# Patient Record
Sex: FEMALE | Race: BLACK OR AFRICAN AMERICAN | Employment: UNEMPLOYED | URBAN - METROPOLITAN AREA
[De-identification: names, ages, dates, MRNs, and addresses within clinical notes are randomized per-mention and may not be internally consistent; named-entity substitution may affect disease eponyms.]

---

## 2017-08-15 ENCOUNTER — OFFICE VISIT (OUTPATIENT)
Dept: INTERNAL MEDICINE CLINIC | Age: 52
End: 2017-08-15

## 2017-08-15 VITALS
SYSTOLIC BLOOD PRESSURE: 110 MMHG | TEMPERATURE: 98.1 F | DIASTOLIC BLOOD PRESSURE: 60 MMHG | OXYGEN SATURATION: 100 % | BODY MASS INDEX: 20.25 KG/M2 | HEART RATE: 74 BPM | WEIGHT: 129 LBS | RESPIRATION RATE: 16 BRPM | HEIGHT: 67 IN

## 2017-08-15 DIAGNOSIS — Z12.11 SCREENING FOR COLON CANCER: ICD-10-CM

## 2017-08-15 DIAGNOSIS — N39.0 URINARY TRACT INFECTION WITHOUT HEMATURIA, SITE UNSPECIFIED: Primary | ICD-10-CM

## 2017-08-15 DIAGNOSIS — Z11.59 NEED FOR HEPATITIS C SCREENING TEST: ICD-10-CM

## 2017-08-15 LAB
BILIRUB UR QL STRIP: NEGATIVE
GLUCOSE UR-MCNC: NEGATIVE MG/DL
KETONES P FAST UR STRIP-MCNC: NEGATIVE MG/DL
PH UR STRIP: 6 [PH] (ref 4.6–8)
PROT UR QL STRIP: NEGATIVE MG/DL
SP GR UR STRIP: 1.02 (ref 1–1.03)
UA UROBILINOGEN AMB POC: ABNORMAL (ref 0.2–1)
URINALYSIS CLARITY POC: CLEAR
URINALYSIS COLOR POC: YELLOW
URINE BLOOD POC: NEGATIVE
URINE LEUKOCYTES POC: ABNORMAL
URINE NITRITES POC: NEGATIVE

## 2017-08-15 RX ORDER — CIPROFLOXACIN 500 MG/1
500 TABLET ORAL 2 TIMES DAILY
Qty: 14 TAB | Refills: 0 | Status: SHIPPED | OUTPATIENT
Start: 2017-08-15 | End: 2017-08-22

## 2017-08-15 NOTE — PROGRESS NOTES
Igor Hurd is a 47 y.o. female and presents with Results (lab work) and Bladder Infection  . Subjective:    Presents with an abnormal urine results revealing leucocytes present with out any other complaints reported. She also was noted to have bacteria in the urine. Review of Systems  Constitutional: negative for fevers, chills, anorexia and weight loss  Eyes:   negative for visual disturbance and irritation  ENT:   negative for tinnitus,sore throat,nasal congestion,ear pains. hoarseness  Respiratory:  negative for cough, hemoptysis, dyspnea,wheezing  CV:   negative for chest pain, palpitations, lower extremity edema  GI:   negative for nausea, vomiting, diarrhea, abdominal pain,melena  Endo:               negative for polyuria,polydipsia,polyphagia,heat intolerance  Genitourinary: negative for frequency, dysuria and hematuria  Integument:  negative for rash and pruritus  Hematologic:  negative for easy bruising and gum/nose bleeding  Musculoskel: negative for myalgias, arthralgias, back pain, muscle weakness, joint pain  Neurological:  negative for headaches, dizziness, vertigo, memory problems and gait   Behavl/Psych: negative for feelings of anxiety, depression, mood changes    Past Medical History:   Diagnosis Date    Bunion 7/13/2011    Cellulitis and abscess of unspecified site 7/13/2011    Chronic obstructive pulmonary disease (HCC)     hx bronchitis    Eczema 2/7/2011     History reviewed. No pertinent surgical history.   Social History     Social History    Marital status:      Spouse name: N/A    Number of children: N/A    Years of education: N/A     Social History Main Topics    Smoking status: Current Every Day Smoker     Packs/day: 0.25    Smokeless tobacco: Never Used    Alcohol use No    Drug use: No    Sexual activity: Yes     Other Topics Concern    None     Social History Narrative     Family History   Problem Relation Age of Onset    Diabetes Father    Alethea Poe Hypertension Father     Arthritis-rheumatoid Maternal Grandmother     Hypertension Maternal Grandmother     Cancer Other      Current Outpatient Prescriptions   Medication Sig Dispense Refill    ciprofloxacin HCl (CIPRO) 500 mg tablet Take 1 Tab by mouth two (2) times a day for 7 days. 14 Tab 0     No Known Allergies    Objective:  Visit Vitals    /60 (BP 1 Location: Right arm, BP Patient Position: Sitting)    Pulse 74    Temp 98.1 °F (36.7 °C) (Oral)    Resp 16    Ht 5' 7\" (1.702 m)    Wt 129 lb (58.5 kg)    LMP 07/06/2011    SpO2 100%    BMI 20.2 kg/m2     Physical Exam:   General appearance - alert, well appearing, and in no distress  Mental status - alert, oriented to person, place, and time  EYE-OCLLIN, EOMI, corneas normal, no foreign bodies  ENT-ENT exam normal, no neck nodes or sinus tenderness  Nose - normal and patent, no erythema, discharge or polyps  Mouth - mucous membranes moist, pharynx normal without lesions  Neck - supple, no significant adenopathy   Chest - clear to auscultation, no wheezes, rales or rhonchi, symmetric air entry   Heart - normal rate, regular rhythm, normal S1, S2, no murmurs, rubs, clicks or gallops   Abdomen - soft, nontender, nondistended, no masses or organomegaly  Lymph- no adenopathy palpable  Ext-peripheral pulses normal, no pedal edema, no clubbing or cyanosis  Skin-Warm and dry. no hyperpigmentation, vitiligo, or suspicious lesions  Neuro -alert, oriented, normal speech, no focal findings or movement disorder noted  Neck-normal C-spine, no tenderness, full ROM without pain  Feet-no nail deformities or callus formation with good pulses noted      Results for orders placed or performed in visit on 10/12/15   MYCOPLASMA AB, IGG/IGM   Result Value Ref Range    M. pneumoniae Ab, IgG 239 (H) 0 - 99 U/mL    M. pneumoniae Ab, IgM <770 0 - 769 U/mL       Assessment/Plan:    ICD-10-CM ICD-9-CM    1.  Urinary tract infection without hematuria, site unspecified N39.0 599.0 AMB POC URINALYSIS DIP STICK MANUAL W/O MICRO   2. Screening for colon cancer Z12.11 V76.51 OCCULT BLOOD, IMMUNOASSAY (FIT)   3. Need for hepatitis C screening test Z11.59 V73.89 HEPATITIS C AB     Orders Placed This Encounter    OCCULT BLOOD, IMMUNOASSAY (FIT)     Order Specific Question:   QUEST SOURCE     Answer:   Stool [1161]    HEPATITIS C AB    AMB POC URINALYSIS DIP STICK MANUAL W/O MICRO    ciprofloxacin HCl (CIPRO) 500 mg tablet     Sig: Take 1 Tab by mouth two (2) times a day for 7 days. Dispense:  14 Tab     Refill:  0     lose weight, follow low fat diet, follow low salt diet,Take 81mg aspirin daily  Patient Instructions   Origo.[email or web address] Race Activation    Thank you for requesting access to Funding Circle. Please follow the instructions below to securely access and download your online medical record. Funding Circle allows you to send messages to your doctor, view your test results, renew your prescriptions, schedule appointments, and more. How Do I Sign Up? 1. In your internet browser, go to [email or web address]  2. Click on the First Time User? Click Here link in the Sign In box. You will be redirect to the New Member Sign Up page. 3. Enter your Funding Circle Access Code exactly as it appears below. You will not need to use this code after youve completed the sign-up process. If you do not sign up before the expiration date, you must request a new code. Funding Circle Access Code: Activation code not generated  Current Funding Circle Status: Patient Declined (This is the date your Funding Circle access code will )    4. Enter the last four digits of your Social Security Number (xxxx) and Date of Birth (mm/dd/yyyy) as indicated and click Submit. You will be taken to the next sign-up page. 5. Create a Funding Circle ID. This will be your Funding Circle login ID and cannot be changed, so think of one that is secure and easy to remember. 6. Create a Funding Circle password. You can change your password at any time.   7. Enter your Password Reset Question and Answer. This can be used at a later time if you forget your password. 8. Enter your e-mail address. You will receive e-mail notification when new information is available in [address] 9. Click Sign Up. You can now view and download portions of your medical record. 10. Click the Download Summary menu link to download a portable copy of your medical information. Additional Information    If you have questions, please visit the Frequently Asked Questions section of the Archetype Media website at [email or web address] Remember, Archetype Media is NOT to be used for urgent needs. For medical emergencies, dial 911. Follow-up Disposition:  Return in about 3 months (around 11/15/2017), or if symptoms worsen or fail to improve. I have reviewed with the patient details of the assessment and plan and all questions were answered. Relevent patient education was performed. The most recent lab findings were reviewed with the patient. An After Visit Summary was printed and given to the patient.

## 2017-08-15 NOTE — MR AVS SNAPSHOT
Visit Information Date & Time Provider Department Dept. Phone Encounter #  
 8/15/2017  8:00 AM Gus Lao MD [address] [place] [address] [phone] [id number] Follow-up Instructions Return in about 3 months (around 11/15/2017), or if symptoms worsen or fail to improve. Upcoming Health Maintenance Date Due Hepatitis C Screening 1965 Pneumococcal 19-64 Medium Risk (1 of 1 - PPSV23) 4/27/1983 FOBT Q 1 YEAR AGE 50-75 4/27/2014 INFLUENZA AGE 9 TO ADULT 8/1/2017 BREAST CANCER SCRN MAMMOGRAM 9/1/2017 PAP AKA CERVICAL CYTOLOGY 11/17/2019 DTaP/Tdap/Td series (2 - Td) 2/27/2025 Allergies as of 8/15/2017  Review Complete On: 8/15/2017 By: Gus Lao MD  
 No Known Allergies Current Immunizations  Reviewed on 2/27/2015 Name Date Tdap 2/27/2015 Not reviewed this visit You Were Diagnosed With   
  
 Codes Comments Urinary tract infection without hematuria, site unspecified    -  Primary ICD-10-CM: N39.0 ICD-9-CM: 599.0 Screening for colon cancer     ICD-10-CM: Z12.11 ICD-9-CM: V76.51 Need for hepatitis C screening test     ICD-10-CM: Z11.59 
ICD-9-CM: V73.89 Vitals BP Pulse Temp Resp Height(growth percentile) Weight(growth percentile) 110/60 (BP 1 Location: Right arm, BP Patient Position: Sitting) 74 98.1 °F (36.7 °C) (Oral) 16 5' 7\" (1.702 m) 129 lb (58.5 kg) LMP SpO2 BMI OB Status Smoking Status 07/06/2011 100% 20.2 kg/m2 Postmenopausal Current Every Day Smoker BMI and BSA Data Body Mass Index Body Surface Area  
 20.2 kg/m 2 1.66 m 2 Preferred Pharmacy Pharmacy Name Phone [facility] [phone] Your Updated Medication List  
  
   
This list is accurate as of: 8/15/17  8:44 AM.  Always use your most recent med list.  
  
  
  
  
 ciprofloxacin HCl 500 mg tablet Commonly known as:  CIPRO Take 1 Tab by mouth two (2) times a day for 7 days. Prescriptions Sent to Pharmacy Refills  
 ciprofloxacin HCl (CIPRO) 500 mg tablet 0 Sig: Take 1 Tab by mouth two (2) times a day for 7 days. Class: Normal  
 Pharmacy: [facility] [address] #: [phone] Route: Oral  
  
We Performed the Following AMB POC URINALYSIS DIP STICK MANUAL W/O MICRO [53422 CPT(R)] HEPATITIS C AB [56679 CPT(R)] OCCULT BLOOD, IMMUNOASSAY (FIT) [id number] CPT(R)] Follow-up Instructions Return in about 3 months (around 11/15/2017), or if symptoms worsen or fail to improve. Patient Instructions Pharmlyhart Activation Thank you for requesting access to Tourjive. Please follow the instructions below to securely access and download your online medical record. Tourjive allows you to send messages to your doctor, view your test results, renew your prescriptions, schedule appointments, and more. How Do I Sign Up? 1. In your internet browser, go to [email or web address] 
2. Click on the First Time User? Click Here link in the Sign In box. You will be redirect to the New Member Sign Up page. 3. Enter your Tourjive Access Code exactly as it appears below. You will not need to use this code after youve completed the sign-up process. If you do not sign up before the expiration date, you must request a new code. Tourjive Access Code: Activation code not generated Current Tourjive Status: Patient Declined (This is the date your Tourjive access code will ) 4. Enter the last four digits of your Social Security Number (xxxx) and Date of Birth (mm/dd/yyyy) as indicated and click Submit. You will be taken to the next sign-up page. 5. Create a Tourjive ID.  This will be your Tourjive login ID and cannot be changed, so think of one that is secure and easy to remember. 6. Create a BlueMessaging password. You can change your password at any time. 7. Enter your Password Reset Question and Answer. This can be used at a later time if you forget your password. 8. Enter your e-mail address. You will receive e-mail notification when new information is available in [address] 9. Click Sign Up. You can now view and download portions of your medical record. 10. Click the Download Summary menu link to download a portable copy of your medical information. Additional Information If you have questions, please visit the Frequently Asked Questions section of the BlueMessaging website at [email or web address] Remember, BlueMessaging is NOT to be used for urgent needs. For medical emergencies, dial 911. Please provide this summary of care documentation to your next provider. Your primary care clinician is listed as NONE. If you have any questions after today's visit, please call [phone].

## 2017-08-15 NOTE — PATIENT INSTRUCTIONS
Casen[employer]ar[employer] Runway Thief Activation    Thank you for requesting access to Tradeos. Please follow the instructions below to securely access and download your online medical record. Tradeos allows you to send messages to your doctor, view your test results, renew your prescriptions, schedule appointments, and more. How Do I Sign Up? 1. In your internet browser, go to [email or web address]  2. Click on the First Time User? Click Here link in the Sign In box. You will be redirect to the New Member Sign Up page. 3. Enter your Tradeos Access Code exactly as it appears below. You will not need to use this code after youve completed the sign-up process. If you do not sign up before the expiration date, you must request a new code. Tradeos Access Code: Activation code not generated  Current Tradeos Status: Patient Declined (This is the date your Tradeos access code will )    4. Enter the last four digits of your Social Security Number (xxxx) and Date of Birth (mm/dd/yyyy) as indicated and click Submit. You will be taken to the next sign-up page. 5. Create a Tradeos ID. This will be your Tradeos login ID and cannot be changed, so think of one that is secure and easy to remember. 6. Create a Tradeos password. You can change your password at any time. 7. Enter your Password Reset Question and Answer. This can be used at a later time if you forget your password. 8. Enter your e-mail address. You will receive e-mail notification when new information is available in [address] 9. Click Sign Up. You can now view and download portions of your medical record. 10. Click the Download Summary menu link to download a portable copy of your medical information. Additional Information    If you have questions, please visit the Frequently Asked Questions section of the Tradeos website at [email or web address] Remember, Tradeos is NOT to be used for urgent needs. For medical emergencies, dial 911.

## 2017-12-04 ENCOUNTER — OFFICE VISIT (OUTPATIENT)
Dept: INTERNAL MEDICINE CLINIC | Age: 52
End: 2017-12-04

## 2017-12-04 DIAGNOSIS — R35.0 URINE FREQUENCY: Primary | ICD-10-CM

## 2017-12-04 DIAGNOSIS — N34.2 INFECTIVE URETHRITIS: ICD-10-CM

## 2017-12-04 LAB
BILIRUB UR QL STRIP: NEGATIVE
GLUCOSE UR-MCNC: NEGATIVE MG/DL
KETONES P FAST UR STRIP-MCNC: NEGATIVE MG/DL
PH UR STRIP: 5.5 [PH] (ref 4.6–8)
PROT UR QL STRIP: NEGATIVE
SP GR UR STRIP: 1.03 (ref 1–1.03)
UA UROBILINOGEN AMB POC: NORMAL (ref 0.2–1)
URINALYSIS CLARITY POC: CLEAR
URINALYSIS COLOR POC: YELLOW
URINE BLOOD POC: NEGATIVE
URINE LEUKOCYTES POC: NORMAL
URINE NITRITES POC: NEGATIVE

## 2017-12-04 NOTE — PATIENT INSTRUCTIONS
CloudByte Activation    Thank you for requesting access to CloudByte. Please follow the instructions below to securely access and download your online medical record. CloudByte allows you to send messages to your doctor, view your test results, renew your prescriptions, schedule appointments, and more. How Do I Sign Up? 1. In your internet browser, go to [email or web address]  2. Click on the First Time User? Click Here link in the Sign In box. You will be redirect to the New Member Sign Up page. 3. Enter your CloudByte Access Code exactly as it appears below. You will not need to use this code after youve completed the sign-up process. If you do not sign up before the expiration date, you must request a new code. CloudByte Access Code: [email or web address]  Expires: 3/4/2018  2:23 PM (This is the date your CloudByte access code will )    4. Enter the last four digits of your Social Security Number (xxxx) and Date of Birth (mm/dd/yyyy) as indicated and click Submit. You will be taken to the next sign-up page. 5. Create a CloudByte ID. This will be your CloudByte login ID and cannot be changed, so think of one that is secure and easy to remember. 6. Create a CloudByte password. You can change your password at any time. 7. Enter your Password Reset Question and Answer. This can be used at a later time if you forget your password. 8. Enter your e-mail address. You will receive e-mail notification when new information is available in [address] 9. Click Sign Up. You can now view and download portions of your medical record. 10. Click the Download Summary menu link to download a portable copy of your medical information. Additional Information    If you have questions, please visit the Frequently Asked Questions section of the CloudByte website at [email or web address] Remember, CloudByte is NOT to be used for urgent needs. For medical emergencies, dial 911.

## 2017-12-04 NOTE — PROGRESS NOTES
Alvaro Bass is a 47 y.o. female and presents with No chief complaint on file. .  Subjective:  She has a faint urine discharge  She has denied any symptoms        Review of Systems  Constitutional: negative for fevers, chills, anorexia and weight loss  Eyes:   negative for visual disturbance and irritation  ENT:   negative for tinnitus,sore throat,nasal congestion,ear pains. hoarseness  Respiratory:  negative for cough, hemoptysis, dyspnea,wheezing  CV:   negative for chest pain, palpitations, lower extremity edema  GI:   negative for nausea, vomiting, diarrhea, abdominal pain,melena  Endo:               negative for polyuria,polydipsia,polyphagia,heat intolerance  Genitourinary: negative for frequency, dysuria and hematuria  Integument:  negative for rash and pruritus  Hematologic:  negative for easy bruising and gum/nose bleeding  Musculoskel: negative for myalgias, arthralgias, back pain, muscle weakness, joint pain  Neurological:  negative for headaches, dizziness, vertigo, memory problems and gait   Behavl/Psych: negative for feelings of anxiety, depression, mood changes    Past Medical History:   Diagnosis Date    Bunion 7/13/2011    Cellulitis and abscess of unspecified site 7/13/2011    Chronic obstructive pulmonary disease (HCC)     hx bronchitis    Eczema 2/7/2011     No past surgical history on file.   Social History     Social History    Marital status:      Spouse name: N/A    Number of children: N/A    Years of education: N/A     Social History Main Topics    Smoking status: Current Every Day Smoker     Packs/day: 0.25    Smokeless tobacco: Never Used    Alcohol use No    Drug use: No    Sexual activity: Yes     Other Topics Concern    Not on file     Social History Narrative     Family History   Problem Relation Age of Onset    Diabetes Father     Hypertension Father     Arthritis-rheumatoid Maternal Grandmother     Hypertension Maternal Grandmother     Cancer Other No Known Allergies    Objective:  Visit Vitals    [facility] 07/06/2011     Physical Exam:   General appearance - alert, well appearing, and in no distress  Mental status - alert, oriented to person, place, and time  EYE-COLLIN, EOMI, corneas normal, no foreign bodies  ENT-ENT exam normal, no neck nodes or sinus tenderness  Nose - normal and patent, no erythema, discharge or polyps  Mouth - mucous membranes moist, pharynx normal without lesions  Neck - supple, no significant adenopathy   Chest - clear to auscultation, no wheezes, rales or rhonchi, symmetric air entry   Heart - normal rate, regular rhythm, normal S1, S2, no murmurs, rubs, clicks or gallops   Abdomen - soft, nontender, nondistended, no masses or organomegaly  Lymph- no adenopathy palpable  Ext-peripheral pulses normal, no pedal edema, no clubbing or cyanosis  Skin-Warm and dry. no hyperpigmentation, vitiligo, or suspicious lesions  Neuro -alert, oriented, normal speech, no focal findings or movement disorder noted  Neck-normal C-spine, no tenderness, full ROM without pain  Feet-no nail deformities or callus formation with good pulses noted      Results for orders placed or performed in visit on 08/15/17   AMB POC URINALYSIS DIP STICK MANUAL W/O MICRO   Result Value Ref Range    Color (UA POC) Yellow     Clarity (UA POC) Clear     Glucose (UA POC) Negative Negative    Bilirubin (UA POC) Negative Negative    Ketones (UA POC) Negative Negative    Specific gravity (UA POC) 1.020 1.001 - 1.035    Blood (UA POC) Negative Negative    pH (UA POC) 6.0 4.6 - 8.0    Protein (UA POC) Negative Negative mg/dL    Urobilinogen (UA POC) 0.2 mg/dL 0.2 - 1    Nitrites (UA POC) Negative Negative    Leukocyte esterase (UA POC) 3+ Negative       Assessment/Plan:    ICD-10-CM ICD-9-CM    1.  Infective urethritis N34.2 597.80 CULTURE, URINE     Orders Placed This Encounter    CULTURE, URINE     lose weight, increase physical activity, limit alcohol consumption,Take 81mg aspirin daily  Patient Instructions   MyChart Activation    Thank you for requesting access to Patient Feed. Please follow the instructions below to securely access and download your online medical record. Patient Feed allows you to send messages to your doctor, view your test results, renew your prescriptions, schedule appointments, and more. How Do I Sign Up? 1. In your internet browser, go to [email or web address]  2. Click on the First Time User? Click Here link in the Sign In box. You will be redirect to the New Member Sign Up page. 3. Enter your Patient Feed Access Code exactly as it appears below. You will not need to use this code after youve completed the sign-up process. If you do not sign up before the expiration date, you must request a new code. Patient Feed Access Code: [email or web address]  Expires: 3/4/2018  2:23 PM (This is the date your Patient Feed access code will )    4. Enter the last four digits of your Social Security Number (xxxx) and Date of Birth (mm/dd/yyyy) as indicated and click Submit. You will be taken to the next sign-up page. 5. Create a Patient Feed ID. This will be your Patient Feed login ID and cannot be changed, so think of one that is secure and easy to remember. 6. Create a Patient Feed password. You can change your password at any time. 7. Enter your Password Reset Question and Answer. This can be used at a later time if you forget your password. 8. Enter your e-mail address. You will receive e-mail notification when new information is available in [address] 9. Click Sign Up. You can now view and download portions of your medical record. 10. Click the Download Summary menu link to download a portable copy of your medical information. Additional Information    If you have questions, please visit the Frequently Asked Questions section of the Patient Feed website at [email or web address] Remember, Patient Feed is NOT to be used for urgent needs.  For medical emergencies, dial 911.           Follow-up Disposition:  Return in about 3 months (around 3/4/2018), or if symptoms worsen or fail to improve. I have reviewed with the patient details of the assessment and plan and all questions were answered. Relevent patient education was performed. The most recent lab findings were reviewed with the patient. An After Visit Summary was printed and given to the patient.

## 2017-12-20 VITALS
OXYGEN SATURATION: 95 % | SYSTOLIC BLOOD PRESSURE: 90 MMHG | DIASTOLIC BLOOD PRESSURE: 66 MMHG | TEMPERATURE: 98.2 F | BODY MASS INDEX: 20.09 KG/M2 | HEIGHT: 67 IN | RESPIRATION RATE: 16 BRPM | WEIGHT: 128 LBS | HEART RATE: 63 BPM

## 2018-01-03 ENCOUNTER — HOSPITAL ENCOUNTER (EMERGENCY)
Age: 53
Discharge: HOME | End: 2018-01-03
Attending: EMERGENCY MEDICINE | Admitting: EMERGENCY MEDICINE
Payer: COMMERCIAL

## 2018-01-03 ENCOUNTER — APPOINTMENT (OUTPATIENT)
Dept: GENERAL RADIOLOGY | Age: 53
End: 2018-01-03
Attending: PHYSICIAN ASSISTANT
Payer: COMMERCIAL

## 2018-01-03 VITALS
HEART RATE: 69 BPM | OXYGEN SATURATION: 99 % | BODY MASS INDEX: 20.31 KG/M2 | WEIGHT: 129.41 LBS | SYSTOLIC BLOOD PRESSURE: 111 MMHG | HEIGHT: 67 IN | TEMPERATURE: 99.9 F | DIASTOLIC BLOOD PRESSURE: 61 MMHG | RESPIRATION RATE: 20 BRPM

## 2018-01-03 DIAGNOSIS — F17.200 NICOTINE DEPENDENCE, UNCOMPLICATED, UNSPECIFIED NICOTINE PRODUCT TYPE: ICD-10-CM

## 2018-01-03 DIAGNOSIS — J10.1 INFLUENZA B: Primary | ICD-10-CM

## 2018-01-03 LAB
APPEARANCE UR: ABNORMAL
BACTERIA URNS QL MICRO: ABNORMAL /HPF
BILIRUB UR QL: NEGATIVE
COLOR UR: ABNORMAL
EPITH CASTS URNS QL MICRO: ABNORMAL /LPF
FLUAV AG NPH QL IA: NEGATIVE
FLUBV AG NOSE QL IA: POSITIVE
GLUCOSE UR STRIP.AUTO-MCNC: NEGATIVE MG/DL
HGB UR QL STRIP: NEGATIVE
KETONES UR QL STRIP.AUTO: NEGATIVE MG/DL
LEUKOCYTE ESTERASE UR QL STRIP.AUTO: NEGATIVE
MUCOUS THREADS URNS QL MICRO: ABNORMAL /LPF
NITRITE UR QL STRIP.AUTO: NEGATIVE
PH UR STRIP: 5 [PH] (ref 5–8)
PROT UR STRIP-MCNC: 30 MG/DL
RBC #/AREA URNS HPF: ABNORMAL /HPF (ref 0–5)
SP GR UR REFRACTOMETRY: 1.03 (ref 1–1.03)
UA: UC IF INDICATED,UAUC: ABNORMAL
UROBILINOGEN UR QL STRIP.AUTO: 0.2 EU/DL (ref 0.2–1)
WBC URNS QL MICRO: ABNORMAL /HPF (ref 0–4)

## 2018-01-03 PROCEDURE — 87086 URINE CULTURE/COLONY COUNT: CPT | Performed by: EMERGENCY MEDICINE

## 2018-01-03 PROCEDURE — 99283 EMERGENCY DEPT VISIT LOW MDM: CPT

## 2018-01-03 PROCEDURE — 71046 X-RAY EXAM CHEST 2 VIEWS: CPT

## 2018-01-03 PROCEDURE — 81001 URINALYSIS AUTO W/SCOPE: CPT | Performed by: EMERGENCY MEDICINE

## 2018-01-03 PROCEDURE — 87804 INFLUENZA ASSAY W/OPTIC: CPT | Performed by: EMERGENCY MEDICINE

## 2018-01-03 RX ORDER — OSELTAMIVIR PHOSPHATE 75 MG/1
75 CAPSULE ORAL 2 TIMES DAILY
Qty: 10 CAP | Refills: 0 | Status: SHIPPED | OUTPATIENT
Start: 2018-01-03 | End: 2018-01-08

## 2018-01-03 RX ORDER — BENZONATATE 100 MG/1
100 CAPSULE ORAL
Qty: 30 CAP | Refills: 0 | Status: SHIPPED | OUTPATIENT
Start: 2018-01-03 | End: 2018-01-10

## 2018-01-03 NOTE — ED TRIAGE NOTES
Pt arrived ambulatory from [facility] to [facility] bed 6 with cc of chills/body aches/ headache and fatigue. Pt states she has been feeling bad for 2 days. pt denies any vomiting, diarrhea. Pt in no acute distress. VSS.

## 2018-01-03 NOTE — DISCHARGE INSTRUCTIONS

## 2018-01-03 NOTE — Clinical Note
1. Start Tamiflu and Tessalon Perles 2. Rest, increase fluids, ibuprofen or tylenol as needed for fevers 3.  Follow up with PCP

## 2018-01-03 NOTE — ED PROVIDER NOTES
EMERGENCY DEPARTMENT HISTORY AND PHYSICAL EXAM      Date: 1/3/2018  Patient Name: Sonia Dill    History of Presenting Illness     Chief Complaint   Patient presents with    Chills     Cold or flu that has been going on for a day or two       History Provided By: Patient    HPI: Sonia Dill, 47 y.o. female with PMHx significant for COPD, presents ambulatory to the ED with cc of persistent fever and cough that is intermittently productive over the past two days. She notes recent contact with family members who currently have similar sx's. Pt endorses taking Mucinex last night wnr of sx's. Pt notes 1 episode of vomiting yesterday but has otherwise been able to tolerate fluids. She reports tobacco use, but denies EtOH and illicit drug use. Pt specifically denies any sore throat, CP, SOB, and abdominal pain. PCP: None    There are no other complaints, changes, or physical findings at this time. Past History     Past Medical History:  Past Medical History:   Diagnosis Date    Bunion 7/13/2011    Cellulitis and abscess of unspecified site 7/13/2011    Chronic obstructive pulmonary disease (HCC)     hx bronchitis    Eczema 2/7/2011       Past Surgical History:  History reviewed. No pertinent surgical history. Family History:  Family History   Problem Relation Age of Onset    Diabetes Father     Hypertension Father     Arthritis-rheumatoid Maternal Grandmother     Hypertension Maternal Grandmother     Cancer Other        Social History:  Social History   Substance Use Topics    Smoking status: Current Every Day Smoker     Packs/day: 0.25    Smokeless tobacco: Never Used    Alcohol use No       Allergies:  No Known Allergies      Review of Systems   Review of Systems   Constitutional: Positive for fever. Negative for chills. HENT: Negative. Negative for rhinorrhea and sore throat. Eyes: Negative. Negative for visual disturbance. Respiratory: Positive for cough.  Negative for chest tightness, shortness of breath and wheezing. Cardiovascular: Negative. Negative for chest pain and palpitations. Gastrointestinal: Negative. Negative for abdominal pain, constipation, diarrhea, nausea and vomiting. Genitourinary: Negative. Negative for dysuria and hematuria. Musculoskeletal: Negative. Negative for arthralgias and myalgias. Skin: Negative. Negative for rash. Allergic/Immunologic: Negative. Negative for environmental allergies and food allergies. Neurological: Negative. Negative for headaches. Psychiatric/Behavioral: Negative. Negative for suicidal ideas. Physical Exam   Physical Exam   Constitutional: She is oriented to person, place, and time. She appears well-developed and well-nourished. No distress. Pt is an AAF, appears sickly but non-toxic, awake and alert in NAD. HENT:   Head: Normocephalic and atraumatic. Right Ear: Tympanic membrane, external ear and ear canal normal.   Left Ear: Tympanic membrane, external ear and ear canal normal.   Nose: Rhinorrhea (clear) present. Mouth/Throat: Uvula is midline, oropharynx is clear and moist and mucous membranes are normal.   Eyes: Conjunctivae and EOM are normal. Pupils are equal, round, and reactive to light. Right eye exhibits discharge (clear). Left eye exhibits discharge (clear). Neck: Normal range of motion. Cardiovascular: Normal rate and normal heart sounds. Pulmonary/Chest: Effort normal and breath sounds normal. No stridor. No respiratory distress. She has no wheezes. She has no rales. +productive cough on exam   Abdominal: Soft. Bowel sounds are normal. There is no tenderness. There is no guarding. No CVA tenderness b/l. Musculoskeletal: Normal range of motion. She exhibits no edema or tenderness. Neurological: She is alert and oriented to person, place, and time. Coordination normal.   No focal neuro deficits. Skin: Skin is warm and dry. No rash noted. She is not diaphoretic.  No erythema. No pallor. Psychiatric: She has a normal mood and affect. Her behavior is normal.   Vitals reviewed. Diagnostic Study Results     Labs -     Recent Results (from the past 12 hour(s))   INFLUENZA A & B AG (RAPID TEST)    Collection Time: 01/03/18  3:20 PM   Result Value Ref Range    Influenza A Antigen NEGATIVE  NEG      Influenza B Antigen POSITIVE (A) NEG     URINALYSIS W/ REFLEX CULTURE    Collection Time: 01/03/18  5:36 PM   Result Value Ref Range    Color DARK YELLOW      Appearance CLOUDY (A) CLEAR      Specific gravity 1.030 1.003 - 1.030      pH (UA) 5.0 5.0 - 8.0      Protein 30 (A) NEG mg/dL    Glucose NEGATIVE  NEG mg/dL    Ketone NEGATIVE  NEG mg/dL    Bilirubin NEGATIVE  NEG      Blood NEGATIVE  NEG      Urobilinogen 0.2 0.2 - 1.0 EU/dL    Nitrites NEGATIVE  NEG      Leukocyte Esterase NEGATIVE  NEG      WBC 0-4 0 - 4 /hpf    RBC 0-5 0 - 5 /hpf    Epithelial cells MANY (A) FEW /lpf    Bacteria 2+ (A) NEG /hpf    UA:UC IF INDICATED URINE CULTURE ORDERED (A) CNI      Mucus 3+ (A) NEG /lpf       Radiologic Studies -     CXR Results  (Last 48 hours)               01/03/18 1841  XR CHEST PA LAT Final result    Impression:  IMPRESSION: No airspace disease or other acute abnormality. Narrative:  EXAM:  XR CHEST PA LAT. INDICATION: Flulike symptoms. COMPARISON: None. FINDINGS:    PA and lateral radiographs of the chest were obtained. Lungs: The lungs are clear of mass, nodule, airspace disease or edema. Pleura: There is no pleural effusion or pneumothorax. Mediastinum: The cardiac and mediastinal contours and pulmonary vascularity are   normal.  The aorta is atherosclerotic. Bones and soft tissues: The bones and soft tissues are within normal limits. Medical Decision Making   I am the first provider for this patient.     I reviewed the vital signs, available nursing notes, past medical history, past surgical history, family history and social history. Vital Signs-Reviewed the patient's vital signs. Patient Vitals for the past 12 hrs:   Temp Pulse Resp BP SpO2   01/03/18 1510 99.9 °F (37.7 °C) 69 20 111/61 99 %       Records Reviewed: Nursing Notes and Old Medical Records    Provider Notes (Medical Decision Making):     DDx: influenza, PNA, URI    ED Course:   Initial assessment performed. The patients presenting problems have been discussed, and they are in agreement with the care plan formulated and outlined with them. I have encouraged them to ask questions as they arise throughout their visit. TOBACCO COUNSELING:  Upon evaluation, pt expressed that they are a current tobacco user. Pt has been counseled on the dangers of smoking and was encouraged to quit as soon as possible in order to decrease further risks to their health. Pt has conveyed their understanding of the risks involved should they continue to use tobacco products. Disposition:    DISCHARGE NOTE:  6:59 PM  The patient is ready for discharge. The patients signs, symptoms, diagnosis, and instructions for discharge have been discussed and the pt has conveyed their understanding. The patient is to follow up as recommended with PCP or return to the ER should their symptoms worsen. Plan has been discussed and patient has conveyed their agreement. PLAN:  1. Discharge home  Current Discharge Medication List      START taking these medications    Details   oseltamivir (TAMIFLU) 75 mg capsule Take 1 Cap by mouth two (2) times a day for 5 days. Qty: 10 Cap, Refills: 0      benzonatate (TESSALON PERLES) 100 mg capsule Take 1 Cap by mouth three (3) times daily as needed for Cough for up to 7 days. Qty: 30 Cap, Refills: 0           2.    Follow-up Information     Follow up With Details Comments Contact Info    PCP- see attached Schedule an appointment as soon as possible for a visit in 2 days      [facility] EMERGENCY DEPT  As needed or, If symptoms worsen [address] [facility] [address] [place] [address]  [phone]        Return to ED if worse     Diagnosis     Clinical Impression:   1. Influenza B    2. Nicotine dependence, uncomplicated, unspecified nicotine product type        Attestations: This note is prepared by Ursula Plascencia, acting as Scribe for Anson Harper PA-C. Anson Harper PA-C: The scribe's documentation has been prepared under my direction and personally reviewed by me in its entirety. I confirm that the note above accurately reflects all work, treatment, procedures, and medical decision making performed by me. This note will not be viewable in [address]

## 2018-01-03 NOTE — LETTER
[address] 
[facility] EMERGENCY DEPT 
[address] [place] [address] [address] [id number] [phone] Work/School Note Date: 1/3/2018 To Whom It May concern: 
 
Sonia Huffman was seen and treated today in the emergency room by the following provider(s): 
Attending Provider: Iva Renee MD 
Physician Assistant: DANIEL Shankar. Sonia Dill may return to work once fever resolves for at least 24 hours. Sincerely, Angel Shankar

## 2018-01-03 NOTE — ED NOTES
PA has reviewed discharge instructions with the patient. The patient verbalized understanding. Pt ambulatory home with discharge papers in hand.

## 2018-01-05 LAB
BACTERIA SPEC CULT: NORMAL
CC UR VC: NORMAL
SERVICE CMNT-IMP: NORMAL

## 2019-06-18 ENCOUNTER — HOSPITAL ENCOUNTER (EMERGENCY)
Age: 54
Discharge: HOME | End: 2019-06-18
Attending: EMERGENCY MEDICINE
Payer: COMMERCIAL

## 2019-06-18 VITALS
DIASTOLIC BLOOD PRESSURE: 78 MMHG | BODY MASS INDEX: 24.96 KG/M2 | HEIGHT: 67 IN | RESPIRATION RATE: 17 BRPM | SYSTOLIC BLOOD PRESSURE: 113 MMHG | WEIGHT: 159 LBS | OXYGEN SATURATION: 99 % | TEMPERATURE: 98.8 F | HEART RATE: 92 BPM

## 2019-06-18 DIAGNOSIS — J06.9 UPPER RESPIRATORY TRACT INFECTION, UNSPECIFIED TYPE: Primary | ICD-10-CM

## 2019-06-18 PROCEDURE — 99282 EMERGENCY DEPT VISIT SF MDM: CPT

## 2019-06-18 RX ORDER — BENZONATATE 100 MG/1
100 CAPSULE ORAL
Qty: 30 CAP | Refills: 0 | Status: SHIPPED | OUTPATIENT
Start: 2019-06-18 | End: 2019-06-25

## 2019-06-18 RX ORDER — AZITHROMYCIN 250 MG/1
TABLET, FILM COATED ORAL
Qty: 6 TAB | Refills: 0 | Status: SHIPPED | OUTPATIENT
Start: 2019-06-18

## 2019-06-18 NOTE — ED PROVIDER NOTES
EMERGENCY DEPARTMENT HISTORY AND PHYSICAL EXAM      Date: 6/18/2019  Patient Name: Sonia Dill    History of Presenting Illness     Chief Complaint   Patient presents with    Cough       History Provided By: Patient    HPI: Sonia Dill, 53 y.o. female with PMHx significant for eczema, presents ambulatory to the ED with cc of productive cough with assoc congestion x 4 days. Denies sore throat, ear pain, fever/chills. Pt has taken mucinex without relief of sx. Denies discomfort currently. Denies hx asthma. Pt does not use inhaler. There are no other complaints, changes, or physical findings at this time. PCP: None    No current facility-administered medications on file prior to encounter. No current outpatient medications on file prior to encounter. Past History     Past Medical History:  Past Medical History:   Diagnosis Date    Bunion 7/13/2011    Cellulitis and abscess of unspecified site 7/13/2011    Chronic obstructive pulmonary disease (HCC)     hx bronchitis    Eczema 2/7/2011       Past Surgical History:  History reviewed. No pertinent surgical history. Family History:  Family History   Problem Relation Age of Onset    Diabetes Father     Hypertension Father     Arthritis-rheumatoid Maternal Grandmother     Hypertension Maternal Grandmother     Cancer Other        Social History:  Social History     Tobacco Use    Smoking status: Current Every Day Smoker     Packs/day: 0.25    Smokeless tobacco: Current User   Substance Use Topics    Alcohol use: No    Drug use: No       Allergies:  No Known Allergies      Review of Systems   Review of Systems   Constitutional: Negative for chills and fever. HENT: Positive for congestion. Negative for postnasal drip, rhinorrhea, sinus pressure, sinus pain, sneezing and sore throat. Respiratory: Positive for cough. Negative for shortness of breath. Cardiovascular: Negative for chest pain.    Gastrointestinal: Negative for abdominal pain, nausea and vomiting. Genitourinary: Negative for flank pain. Musculoskeletal: Negative for back pain and myalgias. Skin: Negative for color change, pallor, rash and wound. Neurological: Negative for dizziness, weakness and light-headedness. All other systems reviewed and are negative. Physical Exam   Physical Exam   Constitutional: She is oriented to person, place, and time. She appears well-developed and well-nourished. No distress. HENT:   Head: Normocephalic and atraumatic. Right Ear: Tympanic membrane, external ear and ear canal normal.   Left Ear: Tympanic membrane, external ear and ear canal normal.   Nose: Mucosal edema present. Right sinus exhibits no maxillary sinus tenderness and no frontal sinus tenderness. Left sinus exhibits no maxillary sinus tenderness and no frontal sinus tenderness. Mouth/Throat: Uvula is midline, oropharynx is clear and moist and mucous membranes are normal.   No tonsillar or pharyngeal erythema   Eyes: Conjunctivae are normal.   Cardiovascular: Normal rate, regular rhythm and normal heart sounds. No murmur heard. Pulmonary/Chest: Effort normal and breath sounds normal. No respiratory distress. Lungs CTA   Abdominal: Soft. Bowel sounds are normal. She exhibits no distension. Musculoskeletal: Normal range of motion. Neurological: She is alert and oriented to person, place, and time. Skin: Skin is warm. No rash noted. Psychiatric: She has a normal mood and affect. Her behavior is normal.   Nursing note and vitals reviewed. Diagnostic Study Results     Labs -   No results found for this or any previous visit (from the past 12 hour(s)). Radiologic Studies -   No orders to display     CT Results  (Last 48 hours)    None        CXR Results  (Last 48 hours)    None            Medical Decision Making   I am the first provider for this patient.     I reviewed the vital signs, available nursing notes, past medical history, past surgical history, family history and social history. Vital Signs-Reviewed the patient's vital signs. Patient Vitals for the past 12 hrs:   Temp Pulse Resp BP SpO2   06/18/19 1130 98.8 °F (37.1 °C) 92 17 113/78 99 %       Records Reviewed: Nursing Notes and Old Medical Records    Provider Notes (Medical Decision Making):   DDx: URI, PNA, Bronchitis    Pt treated for PNA with abx, so CXR would not change treatment plan. ED Course:   Initial assessment performed. The patients presenting problems have been discussed, and they are in agreement with the care plan formulated and outlined with them. I have encouraged them to ask questions as they arise throughout their visit. Disposition:  11:50 AM  Discussed dx and treatment plan. Discussed importance of PCP follow up. All questions answered. Pt voiced they understood. Return if sx worsen. PLAN:  1. Current Discharge Medication List      START taking these medications    Details   azithromycin (ZITHROMAX) 250 mg tablet Take two tablets today then one tablet daily  Qty: 6 Tab, Refills: 0      benzonatate (TESSALON PERLES) 100 mg capsule Take 1 Cap by mouth three (3) times daily as needed for Cough for up to 7 days. Qty: 30 Cap, Refills: 0           2. Follow-up Information     Follow up With Specialties Details Why [address]  Schedule an appointment as soon as possible for a visit As needed [address] [facility] [place], [address] [place] [address] Drive  [phone]        Return to ED if worse     Diagnosis     Clinical Impression:   1.  Upper respiratory tract infection, unspecified type

## 2019-06-18 NOTE — LETTER
[facility] EMERGENCY DEPT 
[address] [facility] [address] [id number] [phone] Work/School Note Date: 6/18/2019 To Whom It May concern: 
 
Sonia Dill was seen and treated today in the emergency room by the following provider(s): 
Attending Provider: Moose Arguelles MD 
Physician Assistant: DANIEL Sofia. Sonia Dill may return to work on 6/19/2019. Sincerely, Columbus Severin, PA

## 2019-06-18 NOTE — ED NOTES
Pt presents in ER with cough with clear phlegm x 3-4 days,denies n/v/d,chest pain,sob. Pt alert,oriented x 4,no acute distress noticed on arrival.   Emergency Department Nursing Plan of Care       The Nursing Plan of Care is developed from the Nursing assessment and Emergency Department Attending provider initial evaluation. The plan of care may be reviewed in the ED Provider note.     The Plan of Care was developed with the following considerations:   Patient / Family readiness to learn indicated by:verbalized understanding  Persons(s) to be included in education: patient  Barriers to Learning/Limitations:No    Signed     Benji Villegas, RN    6/18/2019   11:35 AM

## 2019-06-18 NOTE — ED TRIAGE NOTES
Patient 1ppd smoker, presents to ED with c/o cough and congestion persisting for several days. Currently afebrile. Describes occasional cough.

## 2019-06-18 NOTE — DISCHARGE INSTRUCTIONS
Patient Education        Upper Respiratory Infection (Cold): Care Instructions  Your Care Instructions    An upper respiratory infection, or URI, is an infection of the nose, sinuses, or throat. URIs are spread by coughs, sneezes, and direct contact. The common cold is the most frequent kind of URI. The flu and sinus infections are other kinds of URIs. Almost all URIs are caused by viruses. Antibiotics won't cure them. But you can treat most infections with home care. This may include drinking lots of fluids and taking over-the-counter pain medicine. You will probably feel better in 4 to 10 days. The doctor has checked you carefully, but problems can develop later. If you notice any problems or new symptoms, get medical treatment right away. Follow-up care is a key part of your treatment and safety. Be sure to make and go to all appointments, and call your doctor if you are having problems. It's also a good idea to know your test results and keep a list of the medicines you take. How can you care for yourself at home? · To prevent dehydration, drink plenty of fluids, enough so that your urine is light yellow or clear like water. Choose water and other caffeine-free clear liquids until you feel better. If you have kidney, heart, or liver disease and have to limit fluids, talk with your doctor before you increase the amount of fluids you drink. · Take an over-the-counter pain medicine, such as acetaminophen (Tylenol), ibuprofen (Advil, Motrin), or naproxen (Aleve). Read and follow all instructions on the label. · Before you use cough and cold medicines, check the label. These medicines may not be safe for young children or for people with certain health problems. · Be careful when taking over-the-counter cold or flu medicines and Tylenol at the same time. Many of these medicines have acetaminophen, which is Tylenol. Read the labels to make sure that you are not taking more than the recommended dose.  Too much acetaminophen (Tylenol) can be harmful. · Get plenty of rest.  · Do not smoke or allow others to smoke around you. If you need help quitting, talk to your doctor about stop-smoking programs and medicines. These can increase your chances of quitting for good. When should you call for help? Call 911 anytime you think you may need emergency care. For example, call if:    · You have severe trouble breathing.    Call your doctor now or seek immediate medical care if:    · You seem to be getting much sicker.     · You have new or worse trouble breathing.     · You have a new or higher fever.     · You have a new rash.    Watch closely for changes in your health, and be sure to contact your doctor if:    · You have a new symptom, such as a sore throat, an earache, or sinus pain.     · You cough more deeply or more often, especially if you notice more mucus or a change in the color of your mucus.     · You do not get better as expected. Where can you learn more? Go to http://maciel-ibrahima.info/. Enter M412 in the search box to learn more about \"Upper Respiratory Infection (Cold): Care Instructions. \"  Current as of: September 5, 2018  Content Version: 11.9  © 7629-0869 [employer]. Care instructions adapted under license by [employer] (which disclaims liability or warranty for this information). If you have questions about a medical condition or this instruction, always ask your healthcare professional. [place] [address] any warranty or liability for your use of this information.

## 2023-03-01 ENCOUNTER — OFFICE VISIT (OUTPATIENT)
Dept: INTERNAL MEDICINE CLINIC | Age: 58
End: 2023-03-01

## 2023-03-01 VITALS
DIASTOLIC BLOOD PRESSURE: 70 MMHG | RESPIRATION RATE: 16 BRPM | BODY MASS INDEX: 26.37 KG/M2 | HEART RATE: 104 BPM | TEMPERATURE: 98 F | OXYGEN SATURATION: 96 % | HEIGHT: 67 IN | SYSTOLIC BLOOD PRESSURE: 96 MMHG | WEIGHT: 168 LBS

## 2023-03-01 DIAGNOSIS — R19.7 DIARRHEA OF PRESUMED INFECTIOUS ORIGIN: Primary | ICD-10-CM

## 2023-03-01 PROCEDURE — 99214 OFFICE O/P EST MOD 30 MIN: CPT | Performed by: INTERNAL MEDICINE

## 2023-03-01 RX ORDER — ONDANSETRON 4 MG/1
4 TABLET, ORALLY DISINTEGRATING ORAL
Qty: 30 TABLET | Refills: 0 | Status: SHIPPED | OUTPATIENT
Start: 2023-03-01

## 2023-03-01 RX ORDER — METHADONE HYDROCHLORIDE 5 MG/1
TABLET ORAL
COMMUNITY

## 2023-03-01 RX ORDER — DIPHENOXYLATE HYDROCHLORIDE AND ATROPINE SULFATE 2.5; .025 MG/1; MG/1
2 TABLET ORAL
Qty: 50 TABLET | Refills: 1 | Status: SHIPPED | OUTPATIENT
Start: 2023-03-01

## 2023-03-01 RX ORDER — CIPROFLOXACIN 500 MG/1
500 TABLET ORAL 2 TIMES DAILY
Qty: 14 TABLET | Refills: 0 | Status: SHIPPED | OUTPATIENT
Start: 2023-03-01

## 2023-03-01 NOTE — PROGRESS NOTES
Shellie Pacheco is a 61 y.o. female and presents with Abdominal Pain and Diarrhea  . Subjective:    Patient is a 61 y.o. female with a history  of diarrhea over the past 2 weeks. Patient reports having approximately 3 loose stools per day. The stool contains no blood or greasy droplets. Patient reports associated nausea and crampy, periumbilical pain, but denies any vomiting, fevers, or chills. Patient denies any recent sick exposures, travel outside the [place] or consumption of well water. Patient admits to being on prolonged antibiotics for a pneumonia in 11/2022 . No recent colonoscopy. Review of Systems  Constitutional: negative for fevers, chills, anorexia and weight loss  Eyes:   negative for visual disturbance and irritation  ENT:   negative for tinnitus,sore throat,nasal congestion,ear pains. hoarseness  Respiratory:  negative for cough, hemoptysis, dyspnea,wheezing  CV:   negative for chest pain, palpitations, lower extremity edema  GI:   diarrhea, abdominal pain  Endo:               negative for polyuria,polydipsia,polyphagia,heat intolerance  Genitourinary: negative for frequency, dysuria and hematuria  Integument:  negative for rash and pruritus  Hematologic:  negative for easy bruising and gum/nose bleeding  Musculoskel: negative for myalgias, arthralgias, back pain, muscle weakness, joint pain  Neurological:  negative for headaches, dizziness, vertigo, memory problems and gait   Behavl/Psych: negative for feelings of anxiety, depression, mood changes    Past Medical History:   Diagnosis Date    Bunion 7/13/2011    Cellulitis and abscess of unspecified site 7/13/2011    Chronic obstructive pulmonary disease (HCC)     hx bronchitis    Eczema 2/7/2011     History reviewed. No pertinent surgical history.   Social History     Socioeconomic History    Marital status:    Tobacco Use    Smoking status: Every Day     Packs/day: 0.25     Types: Cigarettes    Smokeless tobacco: Current Substance and Sexual Activity    Alcohol use: No    Drug use: No    Sexual activity: Yes     Family History   Problem Relation Age of Onset    Diabetes Father     Hypertension Father     Arthritis-rheumatoid Maternal Grandmother     Hypertension Maternal Grandmother     Cancer Other      Current Outpatient Medications   Medication Sig Dispense Refill    methadone (DOLOPHINE) 5 mg tablet Take  by mouth. azithromycin (ZITHROMAX) 250 mg tablet Take two tablets today then one tablet daily (Patient not taking: Reported on 3/1/2023) 6 Tab 0     No Known Allergies    Objective:  Visit Vitals  BP 96/70   Pulse (!) 104   Temp 98 °F (36.7 °C) (Oral)   Resp 16   Ht 5' 7\" (1.702 m)   Wt 168 lb (76.2 kg)   LMP 07/06/2011   SpO2 96%   BMI 26.31 kg/m²     Physical Exam:   General appearance - alert, well appearing, and in no distress  Mental status - alert, oriented to person, place, and time  EYE-COLLIN, EOMI, corneas normal, no foreign bodies  ENT-ENT exam normal, no neck nodes or sinus tenderness  Nose - normal and patent, no erythema, discharge or polyps  Mouth - mucous membranes moist, pharynx normal without lesions  Neck - supple, no significant adenopathy   Chest - clear to auscultation, no wheezes, rales or rhonchi, symmetric air entry   Heart - normal rate, regular rhythm, normal S1, S2, no murmurs, rubs, clicks or gallops   Abdomen - soft, nontender, nondistended, no masses or organomegaly  Lymph- no adenopathy palpable  Ext-peripheral pulses normal, no pedal edema, no clubbing or cyanosis  Skin-Warm and dry.  no hyperpigmentation, vitiligo, or suspicious lesions  Neuro -alert, oriented, normal speech, no focal findings or movement disorder noted  Neck-normal C-spine, no tenderness, full ROM without pain  Feet-no nail deformities or callus formation with good pulses noted      Results for orders placed or performed during the hospital encounter of 01/03/18   INFLUENZA A & B AG (RAPID TEST)   Result Value Ref Range Influenza A Antigen NEGATIVE NEG      Influenza B Antigen POSITIVE (A) NEG     CULTURE, URINE    Specimen: Urine   Result Value Ref Range    Special Requests: NO SPECIAL REQUESTS  Reflexed from [id number]        Hobbs Count >100,000  COLONIES/mL        Culture result: MIXED UROGENITAL MEAGHAN ISOLATED     URINALYSIS W/ REFLEX CULTURE    Specimen: Urine   Result Value Ref Range    Color DARK YELLOW      Appearance CLOUDY (A) CLEAR      Specific gravity 1.030 1.003 - 1.030      pH (UA) 5.0 5.0 - 8.0      Protein 30 (A) NEG mg/dL    Glucose NEGATIVE NEG mg/dL    Ketone NEGATIVE NEG mg/dL    Bilirubin NEGATIVE NEG      Blood NEGATIVE NEG      Urobilinogen 0.2 0.2 - 1.0 EU/dL    Nitrites NEGATIVE NEG      Leukocyte Esterase NEGATIVE NEG      WBC 0-4 0 - 4 /hpf    RBC 0-5 0 - 5 /hpf    Epithelial cells MANY (A) FEW /lpf    Bacteria 2+ (A) NEG /hpf    UA:UC IF INDICATED URINE CULTURE ORDERED (A) CNI      Mucus 3+ (A) NEG /lpf       Assessment/Plan:    ICD-10-CM ICD-9-CM    1. Diarrhea of presumed infectious origin  R19.7 009.3         Orders Placed This Encounter    methadone (DOLOPHINE) 5 mg tablet     Sig: Take  by mouth. Full liquid to stars  May need a gi referral,Take 81mg aspirin daily  Patient Instructions   [facility] Activation    Thank you for requesting access to [facility]. Please follow the instructions below to securely access and download your online medical record. [facility] allows you to send messages to your doctor, view your test results, renew your prescriptions, schedule appointments, and more. How Do I Sign Up? In your internet browser, go to [email or web address]  Click on the First Time User? Click Here link in the Sign In box. You will be redirect to the New Member Sign Up page. Enter your [facility] Access Code exactly as it appears below. You will not need to use this code after youve completed the sign-up process.  If you do not sign up before the expiration date, you must request a new code.    [facility] Access Code: [email or web address]  Expires: 2023  1:56 PM (This is the date your [facility]OG access code will )    Enter the last four digits of your Social Security Number (xxxx) and Date of Birth (mm/dd/yyyy) as indicated and click Submit. You will be taken to the next sign-up page. Create a [employer] Solutionst ID. This will be your XOG login ID and cannot be changed, so think of one that is secure and easy to remember. Create a [facility]OG password. You can change your password at any time. Enter your Password Reset Question and Answer. This can be used at a later time if you forget your password. Enter your e-mail address. You will receive e-mail notification when new information is available in [address] Click Sign Up. You can now view and download portions of your medical record. Click the [employer] link to download a portable copy of your medical information. Additional Information    If you have questions, please visit the Frequently Asked Questions section of the XOG website at [email or web address] Remember, XOG is NOT to be used for urgent needs. For medical emergencies, dial 911. Follow-up and Dispositions    Return in about 1 week (around 3/8/2023), or if symptoms worsen or fail to improve. I have reviewed with the patient details of the assessment and plan and all questions were answered. Relevent patient education was performed. The most recent lab findings were reviewed with the patient. An After Visit Summary was printed and given to the patient.

## 2023-03-01 NOTE — PATIENT INSTRUCTIONS
[facility] Activation    Thank you for requesting access to [facility]. Please follow the instructions below to securely access and download your online medical record. [facility] allows you to send messages to your doctor, view your test results, renew your prescriptions, schedule appointments, and more. How Do I Sign Up? In your internet browser, go to [email or web address]  Click on the First Time User? Click Here link in the Sign In box. You will be redirect to the New Member Sign Up page. Enter your [facility] Access Code exactly as it appears below. You will not need to use this code after youve completed the sign-up process. If you do not sign up before the expiration date, you must request a new code. [facility] Access Code: [email or web address]  Expires: 2023  1:56 PM (This is the date your [facility] access code will )    Enter the last four digits of your Social Security Number (xxxx) and Date of Birth (mm/dd/yyyy) as indicated and click Submit. You will be taken to the next sign-up page. Create a [facility] ID. This will be your [facility] login ID and cannot be changed, so think of one that is secure and easy to remember. Create a [facility] password. You can change your password at any time. Enter your Password Reset Question and Answer. This can be used at a later time if you forget your password. Enter your e-mail address. You will receive e-mail notification when new information is available in [address] Click Sign Up. You can now view and download portions of your medical record. Click the [place] link to download a portable copy of your medical information. Additional Information    If you have questions, please visit the Frequently Asked Questions section of the [facility] website at [email or web address] Remember, [facility] is NOT to be used for urgent needs. For medical emergencies, dial 911.

## 2023-03-01 NOTE — PROGRESS NOTES
1. Have you been to the ER, urgent care clinic since your last visit? Hospitalized since your last visit? yes/[facility]/diarrhea    2. Have you seen or consulted any other health care providers outside of the [address] [place] [address] since your last visit? Include any pap smears or colon screening.  no    Chief Complaint   Patient presents with    Abdominal Pain    Diarrhea

## 2023-03-02 LAB
ALBUMIN SERPL-MCNC: 3.2 G/DL (ref 3.5–5)
ALBUMIN/GLOB SERPL: 0.7 (ref 1.1–2.2)
ALP SERPL-CCNC: 109 U/L (ref 45–117)
ALT SERPL-CCNC: 16 U/L (ref 12–78)
ANION GAP SERPL CALC-SCNC: 9 MMOL/L (ref 5–15)
AST SERPL-CCNC: 16 U/L (ref 15–37)
BILIRUB SERPL-MCNC: 0.3 MG/DL (ref 0.2–1)
BUN SERPL-MCNC: 9 MG/DL (ref 6–20)
BUN/CREAT SERPL: 9 (ref 12–20)
CALCIUM SERPL-MCNC: 9.6 MG/DL (ref 8.5–10.1)
CHLORIDE SERPL-SCNC: 104 MMOL/L (ref 97–108)
CO2 SERPL-SCNC: 29 MMOL/L (ref 21–32)
CREAT SERPL-MCNC: 1.03 MG/DL (ref 0.55–1.02)
ERYTHROCYTE [DISTWIDTH] IN BLOOD BY AUTOMATED COUNT: 13.6 % (ref 11.5–14.5)
GLOBULIN SER CALC-MCNC: 4.4 G/DL (ref 2–4)
GLUCOSE SERPL-MCNC: 85 MG/DL (ref 65–100)
HCT VFR BLD AUTO: 47.8 % (ref 35–47)
HGB BLD-MCNC: 15.4 G/DL (ref 11.5–16)
MCH RBC QN AUTO: 30.5 PG (ref 26–34)
MCHC RBC AUTO-ENTMCNC: 32.2 G/DL (ref 30–36.5)
MCV RBC AUTO: 94.7 FL (ref 80–99)
NRBC # BLD: 0 K/UL (ref 0–0.01)
NRBC BLD-RTO: 0 PER 100 WBC
PLATELET # BLD AUTO: 293 K/UL (ref 150–400)
PMV BLD AUTO: 12.1 FL (ref 8.9–12.9)
POTASSIUM SERPL-SCNC: 3.5 MMOL/L (ref 3.5–5.1)
PROT SERPL-MCNC: 7.6 G/DL (ref 6.4–8.2)
RBC # BLD AUTO: 5.05 M/UL (ref 3.8–5.2)
SODIUM SERPL-SCNC: 142 MMOL/L (ref 136–145)
WBC # BLD AUTO: 14.2 K/UL (ref 3.6–11)

## 2023-03-16 ENCOUNTER — OFFICE VISIT (OUTPATIENT)
Dept: INTERNAL MEDICINE CLINIC | Age: 58
End: 2023-03-16
Payer: COMMERCIAL

## 2023-03-16 VITALS
WEIGHT: 178 LBS | HEIGHT: 67 IN | BODY MASS INDEX: 27.94 KG/M2 | DIASTOLIC BLOOD PRESSURE: 78 MMHG | TEMPERATURE: 98 F | RESPIRATION RATE: 16 BRPM | OXYGEN SATURATION: 92 % | SYSTOLIC BLOOD PRESSURE: 120 MMHG | HEART RATE: 78 BPM

## 2023-03-16 DIAGNOSIS — Z12.31 SCREENING MAMMOGRAM FOR BREAST CANCER: Primary | ICD-10-CM

## 2023-03-16 DIAGNOSIS — Z12.31 ENCOUNTER FOR SCREENING MAMMOGRAM FOR MALIGNANT NEOPLASM OF BREAST: ICD-10-CM

## 2023-03-16 PROCEDURE — 99213 OFFICE O/P EST LOW 20 MIN: CPT | Performed by: INTERNAL MEDICINE

## 2023-03-16 NOTE — PROGRESS NOTES
Lis Mary is a 61 y.o. female and presents with Results  . Subjective:    Patient is a 61 y.o. female with a history  of diarrhea that has since resolved. She has not had a recent colonoscopy      Health maintenance suggests the needs for a mammogram      Review of Systems  Constitutional: negative for fevers, chills, anorexia and weight loss  Eyes:   negative for visual disturbance and irritation  ENT:   negative for tinnitus,sore throat,nasal congestion,ear pains. hoarseness  Respiratory:  negative for cough, hemoptysis, dyspnea,wheezing  CV:   negative for chest pain, palpitations, lower extremity edema  GI:   diarrhea, abdominal pain  Endo:               negative for polyuria,polydipsia,polyphagia,heat intolerance  Genitourinary: negative for frequency, dysuria and hematuria  Integument:  negative for rash and pruritus  Hematologic:  negative for easy bruising and gum/nose bleeding  Musculoskel: negative for myalgias, arthralgias, back pain, muscle weakness, joint pain  Neurological:  negative for headaches, dizziness, vertigo, memory problems and gait   Behavl/Psych: negative for feelings of anxiety, depression, mood changes    Past Medical History:   Diagnosis Date    Bunion 7/13/2011    Cellulitis and abscess of unspecified site 7/13/2011    Chronic obstructive pulmonary disease (HCC)     hx bronchitis    Eczema 2/7/2011     History reviewed. No pertinent surgical history.   Social History     Socioeconomic History    Marital status:    Tobacco Use    Smoking status: Every Day     Packs/day: 0.25     Types: Cigarettes    Smokeless tobacco: Current   Substance and Sexual Activity    Alcohol use: No    Drug use: No    Sexual activity: Yes     Family History   Problem Relation Age of Onset    Diabetes Father     Hypertension Father     Arthritis-rheumatoid Maternal Grandmother     Hypertension Maternal Grandmother     Cancer Other      Current Outpatient Medications   Medication Sig Dispense Refill    methadone (DOLOPHINE) 5 mg tablet Take  by mouth. ciprofloxacin HCl (CIPRO) 500 mg tablet Take 1 Tablet by mouth two (2) times a day. (Patient not taking: Reported on 3/16/2023) 14 Tablet 0    diphenoxylate-atropine (LomotiL) 2.5-0.025 mg per tablet Take 2 Tablets by mouth four (4) times daily as needed for Diarrhea. Max Daily Amount: 8 Tablets. (Patient not taking: Reported on 3/16/2023) 50 Tablet 1    ondansetron (ZOFRAN ODT) 4 mg disintegrating tablet Take 1 Tablet by mouth every eight (8) hours as needed for Nausea or Vomiting. (Patient not taking: Reported on 3/16/2023) 30 Tablet 0     No Known Allergies    Objective:  Visit Vitals  /78   Pulse 78   Temp 98 °F (36.7 °C) (Oral)   Resp 16   Ht 5' 7\" (1.702 m)   Wt 178 lb (80.7 kg)   LMP 07/06/2011   SpO2 92%   BMI 27.88 kg/m²     Physical Exam:   General appearance - alert, well appearing, and in no distress  Mental status - alert, oriented to person, place, and time  EYE-COLLIN, EOMI, corneas normal, no foreign bodies  ENT-ENT exam normal, no neck nodes or sinus tenderness  Nose - normal and patent, no erythema, discharge or polyps  Mouth - mucous membranes moist, pharynx normal without lesions  Neck - supple, no significant adenopathy   Chest - clear to auscultation, no wheezes, rales or rhonchi, symmetric air entry   Heart - normal rate, regular rhythm, normal S1, S2, no murmurs, rubs, clicks or gallops   Abdomen - soft, nontender, nondistended, no masses or organomegaly  Lymph- no adenopathy palpable  Ext-peripheral pulses normal, no pedal edema, no clubbing or cyanosis  Skin-Warm and dry.  no hyperpigmentation, vitiligo, or suspicious lesions  Neuro -alert, oriented, normal speech, no focal findings or movement disorder noted  Neck-normal C-spine, no tenderness, full ROM without pain  Feet-no nail deformities or callus formation with good pulses noted      Results for orders placed or performed in visit on 64/03/18   METABOLIC PANEL, COMPREHENSIVE   Result Value Ref Range    Sodium 142 136 - 145 mmol/L    Potassium 3.5 3.5 - 5.1 mmol/L    Chloride 104 97 - 108 mmol/L    CO2 29 21 - 32 mmol/L    Anion gap 9 5 - 15 mmol/L    Glucose 85 65 - 100 mg/dL    BUN 9 6 - 20 MG/DL    Creatinine 1.03 (H) 0.55 - 1.02 MG/DL    BUN/Creatinine ratio 9 (L) 12 - 20      eGFR >60 >60 ml/min/1.73m2    Calcium 9.6 8.5 - 10.1 MG/DL    Bilirubin, total 0.3 0.2 - 1.0 MG/DL    ALT (SGPT) 16 12 - 78 U/L    AST (SGOT) 16 15 - 37 U/L    Alk. phosphatase 109 45 - 117 U/L    Protein, total 7.6 6.4 - 8.2 g/dL    Albumin 3.2 (L) 3.5 - 5.0 g/dL    Globulin 4.4 (H) 2.0 - 4.0 g/dL    A-G Ratio 0.7 (L) 1.1 - 2.2     CBC W/O DIFF   Result Value Ref Range    WBC 14.2 (H) 3.6 - 11.0 K/uL    RBC 5.05 3.80 - 5.20 M/uL    HGB 15.4 11.5 - 16.0 g/dL    HCT 47.8 (H) 35.0 - 47.0 %    MCV 94.7 80.0 - 99.0 FL    MCH 30.5 26.0 - 34.0 PG    MCHC 32.2 30.0 - 36.5 g/dL    RDW 13.6 11.5 - 14.5 %    PLATELET 331 458 - 856 K/uL    MPV 12.1 8.9 - 12.9 FL    NRBC 0.0 0  WBC    ABSOLUTE NRBC 0.00 0.00 - 0.01 K/uL       Assessment/Plan:  No diagnosis found. No orders of the defined types were placed in this encounter. Full liquid to stars  May need a gi referral,Take 81mg aspirin daily  There are no Patient Instructions on file for this visit. I have reviewed with the patient details of the assessment and plan and all questions were answered. Relevent patient education was performed. The most recent lab findings were reviewed with the patient. An After Visit Summary was printed and given to the patient.

## 2023-03-16 NOTE — PATIENT INSTRUCTIONS
[facility] Activation    Thank you for requesting access to [facility]. Please follow the instructions below to securely access and download your online medical record. [facility] allows you to send messages to your doctor, view your test results, renew your prescriptions, schedule appointments, and more. How Do I Sign Up? In your internet browser, go to [email or web address]  Click on the First Time User? Click Here link in the Sign In box. You will be redirect to the New Member Sign Up page. Enter your [facility] Access Code exactly as it appears below. You will not need to use this code after youve completed the sign-up process. If you do not sign up before the expiration date, you must request a new code. [facility] Access Code: [email or web address]  Expires: 2023  2:56 PM (This is the date your [facility] access code will )    Enter the last four digits of your Social Security Number (xxxx) and Date of Birth (mm/dd/yyyy) as indicated and click Submit. You will be taken to the next sign-up page. Create a [facility]ogram ID. This will be your [facility] login ID and cannot be changed, so think of one that is secure and easy to remember. Create a [facility] password. You can change your password at any time. Enter your Password Reset Question and Answer. This can be used at a later time if you forget your password. Enter your e-mail address. You will receive e-mail notification when new information is available in [address] Click Sign Up. You can now view and download portions of your medical record. Click the [place] link to download a portable copy of your medical information. Additional Information    If you have questions, please visit the Frequently Asked Questions section of the [facility]ogram website at [email or web address] Remember, [facility] is NOT to be used for urgent needs. For medical emergencies, dial 911.

## 2023-03-16 NOTE — PROGRESS NOTES
1. Have you been to the ER, urgent care clinic since your last visit? Hospitalized since your last visit?no    2. Have you seen or consulted any other health care providers outside of the [address] [place] [address] since your last visit? Include any pap smears or colon screening.  No    Chief Complaint   Patient presents with    Results

## 2023-04-18 ENCOUNTER — HOSPITAL ENCOUNTER (OUTPATIENT)
Dept: MAMMOGRAPHY | Age: 58
Discharge: HOME | End: 2023-04-18
Attending: INTERNAL MEDICINE
Payer: COMMERCIAL

## 2023-04-18 DIAGNOSIS — Z12.31 SCREENING MAMMOGRAM FOR BREAST CANCER: ICD-10-CM

## 2023-04-18 PROCEDURE — 77063 BREAST TOMOSYNTHESIS BI: CPT

## 2023-06-29 ENCOUNTER — OFFICE VISIT (OUTPATIENT)
Facility: CLINIC | Age: 58
End: 2023-06-29
Payer: MEDICAID

## 2023-06-29 VITALS
WEIGHT: 178 LBS | BODY MASS INDEX: 27.94 KG/M2 | SYSTOLIC BLOOD PRESSURE: 116 MMHG | DIASTOLIC BLOOD PRESSURE: 80 MMHG | OXYGEN SATURATION: 97 % | RESPIRATION RATE: 16 BRPM | HEIGHT: 67 IN | TEMPERATURE: 98 F | HEART RATE: 78 BPM

## 2023-06-29 DIAGNOSIS — J40 BRONCHITIS: Primary | ICD-10-CM

## 2023-06-29 DIAGNOSIS — L84 PRE-ULCERATIVE CORN OR CALLOUS: ICD-10-CM

## 2023-06-29 DIAGNOSIS — M21.611 BUNION OF GREAT TOE OF RIGHT FOOT: ICD-10-CM

## 2023-06-29 PROCEDURE — 99214 OFFICE O/P EST MOD 30 MIN: CPT | Performed by: INTERNAL MEDICINE

## 2023-06-29 RX ORDER — LEVOFLOXACIN 500 MG/1
500 TABLET, FILM COATED ORAL DAILY
Qty: 7 TABLET | Refills: 0 | Status: SHIPPED | OUTPATIENT
Start: 2023-06-29 | End: 2023-07-06

## 2023-06-29 ASSESSMENT — PATIENT HEALTH QUESTIONNAIRE - PHQ9
1. LITTLE INTEREST OR PLEASURE IN DOING THINGS: 0
SUM OF ALL RESPONSES TO PHQ QUESTIONS 1-9: 0
SUM OF ALL RESPONSES TO PHQ9 QUESTIONS 1 & 2: 0
SUM OF ALL RESPONSES TO PHQ QUESTIONS 1-9: 0
SUM OF ALL RESPONSES TO PHQ QUESTIONS 1-9: 0
2. FEELING DOWN, DEPRESSED OR HOPELESS: 0
SUM OF ALL RESPONSES TO PHQ QUESTIONS 1-9: 0

## 2023-12-11 ENCOUNTER — NURSE TRIAGE (OUTPATIENT)
Dept: OTHER | Facility: CLINIC | Age: 58
End: 2023-12-11

## 2023-12-11 NOTE — TELEPHONE ENCOUNTER
Location of patient: [address] [facility] [place] call from Jorje Polancoul at [facility] [place] with [employer]. Subjective: Caller states \"I have knee pain\"     Current Symptoms: Pain in her both of her knees for quite some time. Flat feet with bunyons and hammer toes. Injured her L  after she fell the other day. Swelling. Able to walk but knee is tender and sore. Pain gets worse after standing on her feet all day while she is at work. Onset: 1 week ago; unchanged, waxing and waning    Associated Symptoms: NA    Pain Severity: 5/10; waxing and waning, moderate    What has been tried: Arthritis BC-helps    LMP: NA Pregnant: NA    Recommended disposition: See in Office Today    Care advice provided, patient verbalizes understanding; denies any other questions or concerns; instructed to call back for any new or worsening symptoms. Patient/Caller agrees with recommended disposition; writer provided warm transfer to [facility] at [facility] [place] for appointment scheduling    Attention Provider: Thank you for allowing me to participate in the care of your patient. The patient was connected to triage in response to information provided to the ECC/PSC. Please do not respond through this encounter as the response is not directed to a shared pool.     Reason for Disposition   Patient wants to be seen    Protocols used: Knee Injury-ADULT-[place]

## 2024-02-07 ENCOUNTER — OFFICE VISIT (OUTPATIENT)
Facility: CLINIC | Age: 59
End: 2024-02-07
Payer: MEDICAID

## 2024-02-07 VITALS
RESPIRATION RATE: 16 BRPM | TEMPERATURE: 98 F | HEIGHT: 67 IN | WEIGHT: 187 LBS | BODY MASS INDEX: 29.35 KG/M2 | OXYGEN SATURATION: 92 % | HEART RATE: 69 BPM | SYSTOLIC BLOOD PRESSURE: 120 MMHG | DIASTOLIC BLOOD PRESSURE: 84 MMHG

## 2024-02-07 DIAGNOSIS — M25.461 EFFUSION OF BOTH KNEE JOINTS: Primary | ICD-10-CM

## 2024-02-07 DIAGNOSIS — Z11.59 ENCOUNTER FOR HEPATITIS C SCREENING TEST FOR LOW RISK PATIENT: ICD-10-CM

## 2024-02-07 DIAGNOSIS — Z12.11 SCREENING FOR COLON CANCER: ICD-10-CM

## 2024-02-07 DIAGNOSIS — M25.462 EFFUSION OF BOTH KNEE JOINTS: Primary | ICD-10-CM

## 2024-02-07 DIAGNOSIS — Z11.4 SCREENING FOR HIV (HUMAN IMMUNODEFICIENCY VIRUS): ICD-10-CM

## 2024-02-07 PROCEDURE — 99214 OFFICE O/P EST MOD 30 MIN: CPT | Performed by: INTERNAL MEDICINE

## 2024-02-07 SDOH — ECONOMIC STABILITY: INCOME INSECURITY: HOW HARD IS IT FOR YOU TO PAY FOR THE VERY BASICS LIKE FOOD, HOUSING, MEDICAL CARE, AND HEATING?: NOT HARD AT ALL

## 2024-02-07 SDOH — ECONOMIC STABILITY: HOUSING INSECURITY
IN THE LAST 12 MONTHS, WAS THERE A TIME WHEN YOU DID NOT HAVE A STEADY PLACE TO SLEEP OR SLEPT IN A SHELTER (INCLUDING NOW)?: NO

## 2024-02-07 SDOH — ECONOMIC STABILITY: FOOD INSECURITY: WITHIN THE PAST 12 MONTHS, YOU WORRIED THAT YOUR FOOD WOULD RUN OUT BEFORE YOU GOT MONEY TO BUY MORE.: NEVER TRUE

## 2024-02-07 SDOH — ECONOMIC STABILITY: FOOD INSECURITY: WITHIN THE PAST 12 MONTHS, THE FOOD YOU BOUGHT JUST DIDN'T LAST AND YOU DIDN'T HAVE MONEY TO GET MORE.: NEVER TRUE

## 2024-02-07 ASSESSMENT — PATIENT HEALTH QUESTIONNAIRE - PHQ9
SUM OF ALL RESPONSES TO PHQ9 QUESTIONS 1 & 2: 0
SUM OF ALL RESPONSES TO PHQ QUESTIONS 1-9: 0
2. FEELING DOWN, DEPRESSED OR HOPELESS: 0
SUM OF ALL RESPONSES TO PHQ QUESTIONS 1-9: 0

## 2024-02-07 NOTE — PROGRESS NOTES
1. Have you been to the ER, urgent care clinic since your last visit?  Hospitalized since your last visit?No    2. Have you seen or consulted any other health care providers outside of the [facility] since your last visit?  Include any pap smears or colon screening. No     Chief Complaint   Patient presents with    Knee Pain     bilateral         PHQ-9 Total Score: 0 (2/7/2024  9:47 AM)    
polyps  Mouth - mucous membranes moist, pharynx normal without lesions  Neck - supple, no significant adenopathy   Chest - clear to auscultation, no wheezes, rales or rhonchi, symmetric air entry   Heart - normal rate, regular rhythm, normal S1, S2, no murmurs, rubs, clicks or gallops   Abdomen - soft, nontender, nondistended, no masses or organomegaly  Lymph- no adenopathy palpable  Ext-peripheral pulses normal, no pedal edema, no clubbing or cyanosis  Skin-Warm and dry. no hyperpigmentation, vitiligo, or suspicious lesions  Neuro -alert, oriented, normal speech, no focal findings or movement disorder noted  Neck-normal C-spine, no tenderness, full ROM without pain  Feet-no nail deformities or callus formation with good pulses noted  Knees-crepitations bilaterally with effusion noted    Indications:   Symptomatic relief of large effusion/pain    Procedure:  After consent was obtained, using sterile technique the left knee joint was prepped using alcohol. Local anesthetic used: 1% lidocaine.. The joint was entered and 42 ml's of straw colored/clear fluid was withdrawn and discarded.     Kenalog 40 mg was mixed with 1% lidocaine 3 ml  and injected into the joint and the needle withdrawn.  The procedure was well tolerated.  The patient is asked to continue to rest the joint for a few more days before resuming regular activities.  It may be more painful for the first 1-2 days.  Watch for fever, or increased swelling or persistent pain in the joint. Call or return to clinic prn if such symptoms occur or there is failure to improve as anticipated.    OFFICE PROCEDURE PROGRESS NOTE        Chart reviewed for the following:   Everett RICKETTS MD, have reviewed the History, Physical and updated the Allergic reactions for April K Birdsong     TIME OUT performed immediately prior to start of procedure:   Everett RICKETTS MD, have performed the following reviews on April K Birdsong prior to the start of the procedure:

## 2024-02-07 NOTE — PATIENT INSTRUCTIONS
Thank you for enrolling in [facility]. Please follow the instructions below to securely access your online medical record. [facility] allows you to send messages to your doctor, view your test results, renew your prescriptions, schedule appointments, and more.     How Do I Sign Up?  In your Internet browser, go to [email or web address]  Click on the Sign Up Now link in the Sign In box. You will see the New Member Sign Up page.  Enter your Knovel Access Code exactly as it appears below. You will not need to use this code after you’ve completed the sign-up process. If you do not sign up before the expiration date, you must request a new code.  [facility]novel Access Code: [email or web address]  Expires: 3/2/2024 10:22 AM    Enter your Social Security Number ([id number]) and Date of Birth (mm/dd/yyyy) as indicated and click Submit. You will be taken to the next sign-up page.  Create a Knovel ID. This will be your Knovel login ID and cannot be changed, so think of one that is secure and easy to remember.  Create a Knovel password. You can change your password at any time.  Enter your Password Reset Question and Answer. This can be used at a later time if you forget your password.   Enter your e-mail address. You will receive e-mail notification when new information is available in Knovel.  Click Sign Up. You can now view your medical record.     Additional Information  If you have questions, please contact your physician practice where you receive care. Remember, Knovel is NOT to be used for urgent needs. For medical emergencies, dial 911.

## 2024-02-08 LAB
HCV AB SER IA-ACNC: >11 INDEX
HCV AB SERPL QL IA: REACTIVE

## 2024-02-09 LAB
HCV RNA, QN (IU/ML): NORMAL IU/ML
HCV RNA, QUANTITATIVE REAL TIME PCR: NORMAL IU/ML
Lab: 6.11 LOG10 IU/ML
TEST INFORMATION: NORMAL

## 2024-02-23 RX ORDER — AZITHROMYCIN 250 MG/1
TABLET, FILM COATED ORAL
Qty: 6 TABLET | Refills: 0 | Status: SHIPPED | OUTPATIENT
Start: 2024-02-23 | End: 2024-02-28 | Stop reason: SDUPTHER

## 2024-02-28 ENCOUNTER — OFFICE VISIT (OUTPATIENT)
Facility: CLINIC | Age: 59
End: 2024-02-28
Payer: MEDICAID

## 2024-02-28 ENCOUNTER — HOSPITAL ENCOUNTER (OUTPATIENT)
Facility: HOSPITAL | Age: 59
Discharge: HOME | End: 2024-03-02
Attending: INTERNAL MEDICINE
Payer: MEDICAID

## 2024-02-28 VITALS
DIASTOLIC BLOOD PRESSURE: 70 MMHG | TEMPERATURE: 98.6 F | HEART RATE: 70 BPM | HEIGHT: 67 IN | BODY MASS INDEX: 29.35 KG/M2 | WEIGHT: 187 LBS | RESPIRATION RATE: 20 BRPM | SYSTOLIC BLOOD PRESSURE: 120 MMHG | OXYGEN SATURATION: 98 %

## 2024-02-28 DIAGNOSIS — R76.8 HEPATITIS C ANTIBODY POSITIVE IN BLOOD: ICD-10-CM

## 2024-02-28 DIAGNOSIS — M25.461 KNEE EFFUSION, RIGHT: ICD-10-CM

## 2024-02-28 DIAGNOSIS — M17.12 PRIMARY OSTEOARTHRITIS OF LEFT KNEE: ICD-10-CM

## 2024-02-28 DIAGNOSIS — M25.461 KNEE EFFUSION, RIGHT: Primary | ICD-10-CM

## 2024-02-28 PROCEDURE — 73562 X-RAY EXAM OF KNEE 3: CPT

## 2024-02-28 PROCEDURE — 99214 OFFICE O/P EST MOD 30 MIN: CPT | Performed by: INTERNAL MEDICINE

## 2024-02-28 PROCEDURE — 20610 DRAIN/INJ JOINT/BURSA W/O US: CPT | Performed by: INTERNAL MEDICINE

## 2024-02-28 RX ORDER — AZITHROMYCIN 250 MG/1
TABLET, FILM COATED ORAL
Qty: 6 TABLET | Refills: 0 | Status: SHIPPED | OUTPATIENT
Start: 2024-02-28 | End: 2024-03-09

## 2024-02-28 RX ORDER — TRIAMCINOLONE ACETONIDE 40 MG/ML
40 INJECTION, SUSPENSION INTRA-ARTICULAR; INTRAMUSCULAR ONCE
Status: COMPLETED | OUTPATIENT
Start: 2024-02-28 | End: 2024-02-28

## 2024-02-28 RX ORDER — LIDOCAINE HYDROCHLORIDE 10 MG/ML
2 INJECTION, SOLUTION INFILTRATION; PERINEURAL ONCE
Status: COMPLETED | OUTPATIENT
Start: 2024-02-28 | End: 2024-02-28

## 2024-02-28 RX ADMIN — LIDOCAINE HYDROCHLORIDE 2 ML: 10 INJECTION, SOLUTION INFILTRATION; PERINEURAL at 11:07

## 2024-02-28 RX ADMIN — TRIAMCINOLONE ACETONIDE 40 MG: 40 INJECTION, SUSPENSION INTRA-ARTICULAR; INTRAMUSCULAR at 11:06

## 2024-02-28 SDOH — ECONOMIC STABILITY: FOOD INSECURITY: WITHIN THE PAST 12 MONTHS, THE FOOD YOU BOUGHT JUST DIDN'T LAST AND YOU DIDN'T HAVE MONEY TO GET MORE.: NEVER TRUE

## 2024-02-28 SDOH — ECONOMIC STABILITY: INCOME INSECURITY: HOW HARD IS IT FOR YOU TO PAY FOR THE VERY BASICS LIKE FOOD, HOUSING, MEDICAL CARE, AND HEATING?: SOMEWHAT HARD

## 2024-02-28 SDOH — ECONOMIC STABILITY: FOOD INSECURITY: WITHIN THE PAST 12 MONTHS, YOU WORRIED THAT YOUR FOOD WOULD RUN OUT BEFORE YOU GOT MONEY TO BUY MORE.: NEVER TRUE

## 2024-02-28 ASSESSMENT — ANXIETY QUESTIONNAIRES
4. TROUBLE RELAXING: 0
GAD7 TOTAL SCORE: 0
6. BECOMING EASILY ANNOYED OR IRRITABLE: 0
3. WORRYING TOO MUCH ABOUT DIFFERENT THINGS: 0
7. FEELING AFRAID AS IF SOMETHING AWFUL MIGHT HAPPEN: 0
2. NOT BEING ABLE TO STOP OR CONTROL WORRYING: 0
IF YOU CHECKED OFF ANY PROBLEMS ON THIS QUESTIONNAIRE, HOW DIFFICULT HAVE THESE PROBLEMS MADE IT FOR YOU TO DO YOUR WORK, TAKE CARE OF THINGS AT HOME, OR GET ALONG WITH OTHER PEOPLE: NOT DIFFICULT AT ALL
5. BEING SO RESTLESS THAT IT IS HARD TO SIT STILL: 0

## 2024-02-28 ASSESSMENT — PATIENT HEALTH QUESTIONNAIRE - PHQ9
SUM OF ALL RESPONSES TO PHQ QUESTIONS 1-9: 0
SUM OF ALL RESPONSES TO PHQ9 QUESTIONS 1 & 2: 0
1. LITTLE INTEREST OR PLEASURE IN DOING THINGS: 0
2. FEELING DOWN, DEPRESSED OR HOPELESS: 0

## 2024-02-28 NOTE — PATIENT INSTRUCTIONS
Thank you for enrolling in [facility]. Please follow the instructions below to securely access your online medical record. [facility] allows you to send messages to your doctor, view your test results, renew your prescriptions, schedule appointments, and more.     How Do I Sign Up?  In your Internet browser, go to [email or web address]  Click on the Sign Up Now link in the Sign In box. You will see the New Member Sign Up page.  Enter your [facility] Access Code exactly as it appears below. You will not need to use this code after you’ve completed the sign-up process. If you do not sign up before the expiration date, you must request a new code.  Ur[facility]et Access Code: [email or web address]  Expires: 3/2/2024 10:22 AM    Enter your Social Security Number ([id number]) and Date of Birth (mm/dd/yyyy) as indicated and click Submit. You will be taken to the next sign-up page.  Create a Urjanet ID. This will be your Urjanet login ID and cannot be changed, so think of one that is secure and easy to remember.  Create a Urjanet password. You can change your password at any time.  Enter your Password Reset Question and Answer. This can be used at a later time if you forget your password.   Enter your e-mail address. You will receive e-mail notification when new information is available in Urjanet.  Click Sign Up. You can now view your medical record.     Additional Information  If you have questions, please contact your physician practice where you receive care. Remember, [facility] is NOT to be used for urgent needs. For medical emergencies, dial 911.

## 2024-03-29 ENCOUNTER — OFFICE VISIT (OUTPATIENT)
Facility: CLINIC | Age: 59
End: 2024-03-29
Payer: MEDICAID

## 2024-03-29 VITALS
HEIGHT: 67 IN | HEART RATE: 73 BPM | DIASTOLIC BLOOD PRESSURE: 70 MMHG | WEIGHT: 182 LBS | RESPIRATION RATE: 16 BRPM | OXYGEN SATURATION: 94 % | BODY MASS INDEX: 28.56 KG/M2 | TEMPERATURE: 98.1 F | SYSTOLIC BLOOD PRESSURE: 110 MMHG

## 2024-03-29 DIAGNOSIS — R76.8 HEPATITIS C ANTIBODY POSITIVE IN BLOOD: Primary | ICD-10-CM

## 2024-03-29 DIAGNOSIS — M25.461 KNEE EFFUSION, RIGHT: ICD-10-CM

## 2024-03-29 PROCEDURE — 99214 OFFICE O/P EST MOD 30 MIN: CPT | Performed by: INTERNAL MEDICINE

## 2024-03-29 SDOH — ECONOMIC STABILITY: FOOD INSECURITY: WITHIN THE PAST 12 MONTHS, YOU WORRIED THAT YOUR FOOD WOULD RUN OUT BEFORE YOU GOT MONEY TO BUY MORE.: NEVER TRUE

## 2024-03-29 SDOH — ECONOMIC STABILITY: INCOME INSECURITY: HOW HARD IS IT FOR YOU TO PAY FOR THE VERY BASICS LIKE FOOD, HOUSING, MEDICAL CARE, AND HEATING?: SOMEWHAT HARD

## 2024-03-29 SDOH — ECONOMIC STABILITY: FOOD INSECURITY: WITHIN THE PAST 12 MONTHS, THE FOOD YOU BOUGHT JUST DIDN'T LAST AND YOU DIDN'T HAVE MONEY TO GET MORE.: NEVER TRUE

## 2024-03-29 ASSESSMENT — PATIENT HEALTH QUESTIONNAIRE - PHQ9
SUM OF ALL RESPONSES TO PHQ9 QUESTIONS 1 & 2: 0
SUM OF ALL RESPONSES TO PHQ QUESTIONS 1-9: 0
2. FEELING DOWN, DEPRESSED OR HOPELESS: NOT AT ALL
1. LITTLE INTEREST OR PLEASURE IN DOING THINGS: NOT AT ALL
SUM OF ALL RESPONSES TO PHQ QUESTIONS 1-9: 0

## 2024-03-29 ASSESSMENT — ANXIETY QUESTIONNAIRES
IF YOU CHECKED OFF ANY PROBLEMS ON THIS QUESTIONNAIRE, HOW DIFFICULT HAVE THESE PROBLEMS MADE IT FOR YOU TO DO YOUR WORK, TAKE CARE OF THINGS AT HOME, OR GET ALONG WITH OTHER PEOPLE: NOT DIFFICULT AT ALL
3. WORRYING TOO MUCH ABOUT DIFFERENT THINGS: NOT AT ALL
5. BEING SO RESTLESS THAT IT IS HARD TO SIT STILL: NOT AT ALL
7. FEELING AFRAID AS IF SOMETHING AWFUL MIGHT HAPPEN: NOT AT ALL
GAD7 TOTAL SCORE: 0
1. FEELING NERVOUS, ANXIOUS, OR ON EDGE: NOT AT ALL
4. TROUBLE RELAXING: NOT AT ALL
2. NOT BEING ABLE TO STOP OR CONTROL WORRYING: NOT AT ALL
6. BECOMING EASILY ANNOYED OR IRRITABLE: NOT AT ALL

## 2024-03-29 NOTE — PROGRESS NOTES
\"Have you been to the ER, urgent care clinic since your last visit?  Hospitalized since your last visit?\"    NO    “Have you seen or consulted any other health care providers outside of [facility] since your last visit?”    NO     “Have you had a pap smear?”    NO    Date of last Cervical Cancer screen (HPV or PAP): 11/17/2016         “Have you had a colorectal cancer screening such as a colonoscopy/FIT/Cologuard?        No colonoscopy on file  No cologuard on file  No FIT/FOBT on file   No flexible sigmoidoscopy on file         Click Here for Release of Records Request    
name: None    Number of children: None    Years of education: None    Highest education level: None   Tobacco Use    Smoking status: Every Day     Current packs/day: 0.25     Types: Cigarettes    Smokeless tobacco: Current   Vaping Use    Vaping Use: Never used   Substance and Sexual Activity    Alcohol use: No    Drug use: No    Sexual activity: Defer     Social Determinants of Health     Financial Resource Strain: Medium Risk (3/29/2024)    Overall Financial Resource Strain (CARDIA)     Difficulty of Paying Living Expenses: Somewhat hard   Food Insecurity: No Food Insecurity (3/29/2024)    Hunger Vital Sign     Worried About Running Out of Food in the Last Year: Never true     Ran Out of Food in the Last Year: Never true   Transportation Needs: Unknown (3/29/2024)    PRAPARE - Transportation     Lack of Transportation (Non-Medical): No   Housing Stability: Unknown (3/29/2024)    Housing Stability Vital Sign     Unstable Housing in the Last Year: No     Family History   Problem Relation Age of Onset    Cancer Other     Diabetes Father     Rheum Arthritis Maternal Grandmother     Hypertension Father     Hypertension Maternal Grandmother     Breast Cancer Mother      Current Outpatient Medications   Medication Sig Dispense Refill    diclofenac sodium (VOLTAREN) 1 % GEL Apply 2 g topically 4 times daily 100 g 12    diclofenac sodium (VOLTAREN) 1 % GEL Apply 4 g topically 4 times daily 100 g 12    methadone (DOLOPHINE) 5 MG tablet Take by mouth.       No current facility-administered medications for this visit.     No Known Allergies    Objective:  /70 (Site: Right Upper Arm, Position: Sitting, Cuff Size: Large Adult)   Pulse 73   Temp 98.1 °F (36.7 °C) (Oral)   Resp 16   Ht 1.702 m (5' 7\")   Wt 82.6 kg (182 lb)   SpO2 94%   BMI 28.51 kg/m²   Physical Exam:   General appearance - alert, well appearing, and in moderate distress  Mental status - alert, oriented to person, place, and time  EYE-FERMIN, EOMI,

## 2024-03-30 LAB
ALBUMIN SERPL-MCNC: 4 G/DL (ref 3.8–4.9)
ALBUMIN/GLOB SERPL: 1.1 {RATIO} (ref 1.2–2.2)
ALP SERPL-CCNC: 159 IU/L (ref 44–121)
ALT SERPL-CCNC: 18 IU/L (ref 0–32)
AST SERPL-CCNC: 24 IU/L (ref 0–40)
BILIRUB SERPL-MCNC: 0.2 MG/DL (ref 0–1.2)
BUN SERPL-MCNC: 14 MG/DL (ref 6–24)
BUN/CREAT SERPL: 17 (ref 9–23)
CALCIUM SERPL-MCNC: 9.6 MG/DL (ref 8.7–10.2)
CHLORIDE SERPL-SCNC: 100 MMOL/L (ref 96–106)
CHOLEST SERPL-MCNC: 207 MG/DL (ref 100–199)
CO2 SERPL-SCNC: 23 MMOL/L (ref 20–29)
CREAT SERPL-MCNC: 0.84 MG/DL (ref 0.57–1)
EGFRCR SERPLBLD CKD-EPI 2021: 80 ML/MIN/1.73
ERYTHROCYTE [DISTWIDTH] IN BLOOD BY AUTOMATED COUNT: 13.1 % (ref 11.7–15.4)
GLOBULIN SER CALC-MCNC: 3.7 G/DL (ref 1.5–4.5)
GLUCOSE SERPL-MCNC: 86 MG/DL (ref 70–99)
HCT VFR BLD AUTO: 42.8 % (ref 34–46.6)
HDLC SERPL-MCNC: 66 MG/DL
HGB BLD-MCNC: 14.1 G/DL (ref 11.1–15.9)
IMP & REVIEW OF LAB RESULTS: NORMAL
LDLC SERPL CALC-MCNC: 119 MG/DL (ref 0–99)
MCH RBC QN AUTO: 30.3 PG (ref 26.6–33)
MCHC RBC AUTO-ENTMCNC: 32.9 G/DL (ref 31.5–35.7)
MCV RBC AUTO: 92 FL (ref 79–97)
PLATELET # BLD AUTO: 234 X10E3/UL (ref 150–450)
POTASSIUM SERPL-SCNC: 5.1 MMOL/L (ref 3.5–5.2)
PROT SERPL-MCNC: 7.7 G/DL (ref 6–8.5)
RBC # BLD AUTO: 4.65 X10E6/UL (ref 3.77–5.28)
SODIUM SERPL-SCNC: 140 MMOL/L (ref 134–144)
TRIGL SERPL-MCNC: 127 MG/DL (ref 0–149)
VLDLC SERPL CALC-MCNC: 22 MG/DL (ref 5–40)
WBC # BLD AUTO: 6.1 X10E3/UL (ref 3.4–10.8)

## 2024-04-12 ENCOUNTER — TRANSCRIBE ORDERS (OUTPATIENT)
Facility: HOSPITAL | Age: 59
End: 2024-04-12

## 2024-04-12 DIAGNOSIS — Z12.31 VISIT FOR SCREENING MAMMOGRAM: Primary | ICD-10-CM

## 2024-07-23 ENCOUNTER — HOSPITAL ENCOUNTER (EMERGENCY)
Facility: HOSPITAL | Age: 59
Discharge: HOME | End: 2024-07-23
Attending: EMERGENCY MEDICINE
Payer: MEDICAID

## 2024-07-23 VITALS
HEART RATE: 76 BPM | SYSTOLIC BLOOD PRESSURE: 137 MMHG | BODY MASS INDEX: 26.68 KG/M2 | OXYGEN SATURATION: 96 % | HEIGHT: 67 IN | WEIGHT: 170 LBS | RESPIRATION RATE: 20 BRPM | TEMPERATURE: 99.8 F | DIASTOLIC BLOOD PRESSURE: 83 MMHG

## 2024-07-23 DIAGNOSIS — K04.7 DENTAL ABSCESS: Primary | ICD-10-CM

## 2024-07-23 PROCEDURE — 6360000002 HC RX W HCPCS: Performed by: EMERGENCY MEDICINE

## 2024-07-23 PROCEDURE — 99284 EMERGENCY DEPT VISIT MOD MDM: CPT

## 2024-07-23 PROCEDURE — 6370000000 HC RX 637 (ALT 250 FOR IP): Performed by: EMERGENCY MEDICINE

## 2024-07-23 PROCEDURE — 96372 THER/PROPH/DIAG INJ SC/IM: CPT

## 2024-07-23 RX ORDER — OXYCODONE HYDROCHLORIDE AND ACETAMINOPHEN 5; 325 MG/1; MG/1
1 TABLET ORAL EVERY 6 HOURS PRN
Qty: 15 TABLET | Refills: 0 | Status: SHIPPED | OUTPATIENT
Start: 2024-07-23 | End: 2024-07-28

## 2024-07-23 RX ORDER — OXYCODONE HYDROCHLORIDE AND ACETAMINOPHEN 5; 325 MG/1; MG/1
2 TABLET ORAL
Status: COMPLETED | OUTPATIENT
Start: 2024-07-23 | End: 2024-07-23

## 2024-07-23 RX ORDER — AMOXICILLIN AND CLAVULANATE POTASSIUM 875; 125 MG/1; MG/1
1 TABLET, FILM COATED ORAL
Status: COMPLETED | OUTPATIENT
Start: 2024-07-23 | End: 2024-07-23

## 2024-07-23 RX ORDER — AMOXICILLIN AND CLAVULANATE POTASSIUM 875; 125 MG/1; MG/1
1 TABLET, FILM COATED ORAL 2 TIMES DAILY
Qty: 14 TABLET | Refills: 0 | Status: SHIPPED | OUTPATIENT
Start: 2024-07-23 | End: 2024-07-30

## 2024-07-23 RX ORDER — IBUPROFEN 600 MG/1
600 TABLET ORAL EVERY 6 HOURS PRN
Qty: 20 TABLET | Refills: 1 | Status: SHIPPED | OUTPATIENT
Start: 2024-07-23 | End: 2024-08-02

## 2024-07-23 RX ORDER — KETOROLAC TROMETHAMINE 30 MG/ML
30 INJECTION, SOLUTION INTRAMUSCULAR; INTRAVENOUS
Status: COMPLETED | OUTPATIENT
Start: 2024-07-23 | End: 2024-07-23

## 2024-07-23 RX ADMIN — AMOXICILLIN AND CLAVULANATE POTASSIUM 1 TABLET: 875; 125 TABLET, FILM COATED ORAL at 10:12

## 2024-07-23 RX ADMIN — KETOROLAC TROMETHAMINE 30 MG: 30 INJECTION, SOLUTION INTRAMUSCULAR at 10:13

## 2024-07-23 RX ADMIN — OXYCODONE HYDROCHLORIDE AND ACETAMINOPHEN 2 TABLET: 5; 325 TABLET ORAL at 10:12

## 2024-07-23 NOTE — ED NOTES
Pt presents ambulatory to ED complaining of front top tooth pain  since last night. Pt is alert and oriented x 4, RR even and unlabored, skin is warm and dry. Assesment completed and pt updated on plan of care.       Emergency Department Nursing Plan of Care       The Nursing Plan of Care is developed from the Nursing assessment and Emergency Department Attending provider initial evaluation.  The plan of care may be reviewed in the “ED Provider note”.    The Plan of Care was developed with the following considerations:   Patient / Family readiness to learn indicated by:verbalized understanding  Persons(s) to be included in education: patient  Barriers to Learning/Limitations:None    Signed     Donny De La Garza RN    7/23/2024   8:39 AM

## 2024-07-23 NOTE — DISCHARGE INSTRUCTIONS
Emergency Dental Care     [facility]  [address] [place] [id number]  Open M, W, F: 8AM - 5PM and T, Th: 8AM-6PM  Phone: [phone], press 4  $70 for Emergency Care  $60 for first routine care, then pay by sliding scale based upon income.    [facility]  [address] [place] [address]  Phone: [phone]    [facility]  [address] [place] [address]  Open Monday - Friday 8AM - 4:30 PM  Phone: [phone]    [facility]   [facility], [facility], [address] [place] [address]   Phone: [phone] to confirm a time for emergency treatment   Pediatrics: [phone]   $75 per tooth, extractions only     [facility] Department  [facility], [facility], [address] [place] [address], [address]nd Floor, [address]  First Come First Service starting at 8:30 AM - 3 PM M - F    [facility]   [address] [facility] [address]   Phone [phone] or [phone]   Hours [id number] (extractions)   Simple tooth extraction: $60 per tooth, $55 per x-ray     [facility] Residents only, over the age of 18  Phone: [phone]. Leave message saying you need an appointment to register.  Hours: Tuesday Evenings    [facility] (in [place])   [facility] Residents only   Phone: [phone], leave message saying you need an appointment to register   Hours: Wed 6-9p     Non-Urgent Dental Care Clinics     [facility]   [address] [place] [address]   Dental Clinic: [phone]   Oral Surgery Clinic: [phone]

## 2024-07-23 NOTE — ED NOTES
Discharge instructions were given to the patient by KATELYN Hines.     The patient left the Emergency Department alert and oriented and in no acute distress with 3 prescriptions. The patient was encouraged to call or return to the ED for worsening issues or problems and was encouraged to schedule a follow up appointment for continuing care.     Ambulation assessment completed before discharge.  Pt left Emergency Department at expected ambulatory status with no ortho devices  Ortho device education: none    The patient verbalized understanding of discharge instructions and prescriptions, all questions were answered. The patient has no further concerns at this time.

## 2024-07-23 NOTE — ED PROVIDER NOTES
MDM sections.    Review of Systems   Constitutional:  Negative for fatigue and fever.   HENT:  Positive for dental problem.    All other systems reviewed and are negative.      Physical Exam   Vital Signs  Patient Vitals for the past 8 hrs:   Temp Pulse Resp BP SpO2   07/23/24 1012 -- -- 20 -- --   07/23/24 0831 99.8 °F (37.7 °C) 76 20 137/83 96 %          Physical Exam  Constitutional:       General: She is in acute distress.      Appearance: She is not ill-appearing.   HENT:      Head:      Comments: Very poor dentition, multiple dental caries, no drainable fluid collection.  Small abscess on the roof of the mouth.        Diagnostic Study Results   Labs  No results found for this visit on 07/23/24.   ==============================================================    Radiologic Studies  No orders to display          Critical Care and Billable Procedures   EKG reviewed by ED Physician Dillan in the absence of a cardiologist: Yes  EKG below was independently interpreted by me (Bryce Grimaldo MD)    Procedures    Medical Decision Making   I reviewed the patient's most recent Emergency Dept notes and diagnostic tests in formulating my MDM on today's visit.    Medications administered during ED course:  Medications   amoxicillin-clavulanate (AUGMENTIN) 875-125 MG per tablet 1 tablet (1 tablet Oral Given 7/23/24 1012)   oxyCODONE-acetaminophen (PERCOCET) 5-325 MG per tablet 2 tablet (2 tablets Oral Given 7/23/24 1012)   ketorolac (TORADOL) injection 30 mg (30 mg IntraMUSCular Given 7/23/24 1013)     Medical Decision Making // ED Course // Reassessment:  April [place], 59 y.o. female Complains of severe dental pain.  History of poor dentition.  Daily tobacco smoker.  Teeth started hurting 2 days ago, severe in the right upper teeth and the roof of the mouth.    On examination she appears uncomfortable.  She has very poor dentition and multiple dental caries and few intact teeth.    No drainable fluid collection.

## 2024-07-23 NOTE — ED TRIAGE NOTES
Patient arrivers to triage with cc of pain in the front tooth that is radiating to ears that began last night. Patient report the tooth has been broken for quite some time.

## 2024-08-07 ENCOUNTER — OFFICE VISIT (OUTPATIENT)
Facility: CLINIC | Age: 59
End: 2024-08-07
Payer: MEDICAID

## 2024-08-07 VITALS
BODY MASS INDEX: 28.56 KG/M2 | RESPIRATION RATE: 16 BRPM | HEART RATE: 55 BPM | WEIGHT: 182 LBS | SYSTOLIC BLOOD PRESSURE: 114 MMHG | TEMPERATURE: 98 F | OXYGEN SATURATION: 99 % | DIASTOLIC BLOOD PRESSURE: 84 MMHG | HEIGHT: 67 IN

## 2024-08-07 DIAGNOSIS — M25.462 KNEE EFFUSION, LEFT: Primary | ICD-10-CM

## 2024-08-07 DIAGNOSIS — M17.12 PRIMARY OSTEOARTHRITIS OF LEFT KNEE: ICD-10-CM

## 2024-08-07 PROCEDURE — 99213 OFFICE O/P EST LOW 20 MIN: CPT | Performed by: INTERNAL MEDICINE

## 2024-08-07 RX ORDER — LIDOCAINE HYDROCHLORIDE 10 MG/ML
5 INJECTION, SOLUTION INFILTRATION; PERINEURAL ONCE
Status: COMPLETED | OUTPATIENT
Start: 2024-08-07 | End: 2024-08-07

## 2024-08-07 RX ORDER — ALBUTEROL SULFATE 90 UG/1
2 POWDER, METERED RESPIRATORY (INHALATION) EVERY 4 HOURS PRN
Qty: 1 EACH | Refills: 12 | Status: SHIPPED | OUTPATIENT
Start: 2024-08-07

## 2024-08-07 RX ORDER — TRIAMCINOLONE ACETONIDE 40 MG/ML
40 INJECTION, SUSPENSION INTRA-ARTICULAR; INTRAMUSCULAR ONCE
Status: COMPLETED | OUTPATIENT
Start: 2024-08-07 | End: 2024-08-07

## 2024-08-07 RX ADMIN — TRIAMCINOLONE ACETONIDE 40 MG: 40 INJECTION, SUSPENSION INTRA-ARTICULAR; INTRAMUSCULAR at 13:29

## 2024-08-07 RX ADMIN — LIDOCAINE HYDROCHLORIDE 5 ML: 10 INJECTION, SOLUTION INFILTRATION; PERINEURAL at 13:27

## 2024-08-07 SDOH — ECONOMIC STABILITY: FOOD INSECURITY: WITHIN THE PAST 12 MONTHS, THE FOOD YOU BOUGHT JUST DIDN'T LAST AND YOU DIDN'T HAVE MONEY TO GET MORE.: NEVER TRUE

## 2024-08-07 SDOH — ECONOMIC STABILITY: FOOD INSECURITY: WITHIN THE PAST 12 MONTHS, YOU WORRIED THAT YOUR FOOD WOULD RUN OUT BEFORE YOU GOT MONEY TO BUY MORE.: NEVER TRUE

## 2024-08-07 SDOH — ECONOMIC STABILITY: INCOME INSECURITY: HOW HARD IS IT FOR YOU TO PAY FOR THE VERY BASICS LIKE FOOD, HOUSING, MEDICAL CARE, AND HEATING?: NOT HARD AT ALL

## 2024-08-07 ASSESSMENT — PATIENT HEALTH QUESTIONNAIRE - PHQ9
SUM OF ALL RESPONSES TO PHQ QUESTIONS 1-9: 0
SUM OF ALL RESPONSES TO PHQ9 QUESTIONS 1 & 2: 0
SUM OF ALL RESPONSES TO PHQ9 QUESTIONS 1 & 2: 0
SUM OF ALL RESPONSES TO PHQ QUESTIONS 1-9: 0
1. LITTLE INTEREST OR PLEASURE IN DOING THINGS: NOT AT ALL
2. FEELING DOWN, DEPRESSED OR HOPELESS: NOT AT ALL
2. FEELING DOWN, DEPRESSED OR HOPELESS: NOT AT ALL
SUM OF ALL RESPONSES TO PHQ QUESTIONS 1-9: 0
1. LITTLE INTEREST OR PLEASURE IN DOING THINGS: NOT AT ALL
SUM OF ALL RESPONSES TO PHQ QUESTIONS 1-9: 0
SUM OF ALL RESPONSES TO PHQ QUESTIONS 1-9: 0

## 2024-08-07 NOTE — PATIENT INSTRUCTIONS
Thank you for enrolling in "[employer] [facility],". Please follow the instructions below to securely access your online medical record. "[facility] [facility]," allows you to send messages to your doctor, view your test results, renew your prescriptions, schedule appointments, and more.     How Do I Sign Up?  In your Internet browser, go to [email or web address]  Click on the Sign Up Now link in the Sign In box. You will see the New Member Sign Up page.  Enter your "[employer]," Access Code exactly as it appears below. You will not need to use this code after you’ve completed the sign-up process. If you do not sign up before the expiration date, you must request a new code.  "[facility]," Access Code: [email or web address]  Expires: 8/12/2024  9:58 AM    Enter your Social Security Number ([id number]) and Date of Birth (mm/dd/yyyy) as indicated and click Submit. You will be taken to the next sign-up page.  Create a "[facility] [facility]," ID. This will be your "[facility]," login ID and cannot be changed, so think of one that is secure and easy to remember.  Create a "[facility]," password. You can change your password at any time.  Enter your Password Reset Question and Answer. This can be used at a later time if you forget your password.   Enter your e-mail address. You will receive e-mail notification when new information is available in "[facility] [facility],".  Click Sign Up. You can now view your medical record.     Additional Information  If you have questions, please contact your physician practice where you receive care. Remember, "[facility]," is NOT to be used for urgent needs. For medical emergencies, dial 911.

## 2024-08-07 NOTE — PROGRESS NOTES
1. Have you been to the ER, urgent care clinic since your last visit?  Hospitalized since your last visit?No    2. Have you seen or consulted any other health care providers outside of the [facility] since your last visit?  Include any pap smears or colon screening. No     Chief Complaint   Patient presents with    Knee Pain     left         PHQ-9 Total Score: 0 (8/7/2024 11:54 AM)    
tenderness  Nose - normal and patent, no erythema, discharge or polyps  Mouth - mucous membranes moist, pharynx normal without lesions  Neck - supple, no significant adenopathy   Chest - clear to auscultation, no wheezes, rales or rhonchi, symmetric air entry   Heart - normal rate, regular rhythm, normal S1, S2, no murmurs, rubs, clicks or gallops   Abdomen - soft, nontender, nondistended, no masses or organomegaly  Lymph- no adenopathy palpable  Ext-peripheral pulses normal, no pedal edema, no clubbing or cyanosis  Skin-Warm and dry. no hyperpigmentation, vitiligo, or suspicious lesions  Neuro -alert, oriented, normal speech, no focal findings or movement disorder noted  Neck-normal C-spine, no tenderness, full ROM without pain  Feet-no nail deformities or callus formation with good pulses noted  Knee-medial joint line tenderness noted with effusion    Indications:   Symptomatic relief of pain    Procedure:  After consent was obtained, using sterile technique the left knee joint was prepped using alcohol. Local anesthetic used: 1% lidocaine.. The joint was entered Kenalog 40 mg was mixed with 1% lidocaine 3 ml  and injected into the joint and the needle withdrawn. 33 cc fluid removed The procedure was well tolerated.  The patient is asked to continue to rest the joint for a few more days before resuming regular activities.  It may be more painful for the first 1-2 days.  Watch for fever, or increased swelling or persistent pain in the joint. Call or return to clinic prn if such symptoms occur or there is failure to improve as anticipated.      OFFICE PROCEDURE PROGRESS NOTE        Chart reviewed for the following:   Everett RICKETTS MD, have reviewed the History, Physical and updated the Allergic reactions for April K Birdsong     TIME OUT performed immediately prior to start of procedure:   Everett RICKETTS MD, have performed the following reviews on April K Birdsong prior to the start of the procedure:

## 2024-08-30 ENCOUNTER — COMMUNITY OUTREACH (OUTPATIENT)
Facility: CLINIC | Age: 59
End: 2024-08-30

## 2024-09-04 ENCOUNTER — OFFICE VISIT (OUTPATIENT)
Facility: CLINIC | Age: 59
End: 2024-09-04

## 2024-09-04 VITALS
HEART RATE: 70 BPM | SYSTOLIC BLOOD PRESSURE: 100 MMHG | TEMPERATURE: 98 F | DIASTOLIC BLOOD PRESSURE: 74 MMHG | BODY MASS INDEX: 28.25 KG/M2 | HEIGHT: 67 IN | OXYGEN SATURATION: 97 % | RESPIRATION RATE: 16 BRPM | WEIGHT: 180 LBS

## 2024-09-04 DIAGNOSIS — M25.461 KNEE EFFUSION, RIGHT: Primary | ICD-10-CM

## 2024-09-04 DIAGNOSIS — M25.462 KNEE EFFUSION, LEFT: ICD-10-CM

## 2024-09-04 RX ORDER — LIDOCAINE HYDROCHLORIDE 10 MG/ML
5 INJECTION, SOLUTION INFILTRATION; PERINEURAL ONCE
Status: COMPLETED | OUTPATIENT
Start: 2024-09-04 | End: 2024-09-04

## 2024-09-04 RX ORDER — TRIAMCINOLONE ACETONIDE 40 MG/ML
40 INJECTION, SUSPENSION INTRA-ARTICULAR; INTRAMUSCULAR ONCE
Status: COMPLETED | OUTPATIENT
Start: 2024-09-04 | End: 2024-09-04

## 2024-09-04 RX ADMIN — LIDOCAINE HYDROCHLORIDE 5 ML: 10 INJECTION, SOLUTION INFILTRATION; PERINEURAL at 12:54

## 2024-09-04 RX ADMIN — TRIAMCINOLONE ACETONIDE 40 MG: 40 INJECTION, SUSPENSION INTRA-ARTICULAR; INTRAMUSCULAR at 12:53

## 2024-09-04 SDOH — ECONOMIC STABILITY: FOOD INSECURITY: WITHIN THE PAST 12 MONTHS, YOU WORRIED THAT YOUR FOOD WOULD RUN OUT BEFORE YOU GOT MONEY TO BUY MORE.: NEVER TRUE

## 2024-09-04 SDOH — ECONOMIC STABILITY: FOOD INSECURITY: WITHIN THE PAST 12 MONTHS, THE FOOD YOU BOUGHT JUST DIDN'T LAST AND YOU DIDN'T HAVE MONEY TO GET MORE.: NEVER TRUE

## 2024-09-04 SDOH — ECONOMIC STABILITY: INCOME INSECURITY: HOW HARD IS IT FOR YOU TO PAY FOR THE VERY BASICS LIKE FOOD, HOUSING, MEDICAL CARE, AND HEATING?: NOT HARD AT ALL

## 2024-09-04 ASSESSMENT — PATIENT HEALTH QUESTIONNAIRE - PHQ9
SUM OF ALL RESPONSES TO PHQ9 QUESTIONS 1 & 2: 0
SUM OF ALL RESPONSES TO PHQ QUESTIONS 1-9: 0
1. LITTLE INTEREST OR PLEASURE IN DOING THINGS: NOT AT ALL
2. FEELING DOWN, DEPRESSED OR HOPELESS: NOT AT ALL
SUM OF ALL RESPONSES TO PHQ QUESTIONS 1-9: 0
2. FEELING DOWN, DEPRESSED OR HOPELESS: NOT AT ALL
SUM OF ALL RESPONSES TO PHQ QUESTIONS 1-9: 0
SUM OF ALL RESPONSES TO PHQ QUESTIONS 1-9: 0
SUM OF ALL RESPONSES TO PHQ9 QUESTIONS 1 & 2: 0
1. LITTLE INTEREST OR PLEASURE IN DOING THINGS: NOT AT ALL
SUM OF ALL RESPONSES TO PHQ QUESTIONS 1-9: 0

## 2024-09-04 NOTE — PROGRESS NOTES
Grace Taylor is a 59 y.o. female and presents with osteoartritis (knees)  .  Subjective:    Knee pain Review:  Patient complains of bilaterally knee pain. Onset of the symptoms was months  ago. Inciting event: longstanding problem which has been getting worse. Current symptoms include pain location: both: medial and swelling. none,  Aggravating symptoms: going up and down stairs, walking, lateral movements, any weight bearing. Patient's overall course: gradually worsening Patient has had prior knee problems. Evaluation to date: none.  Treatment to date:NSAIDS          Review of Systems  Constitutional: negative for fevers, chills, anorexia and weight loss  Eyes:   negative for visual disturbance and irritation  ENT:   negative for tinnitus,sore throat,nasal congestion,ear pains.hoarseness  Respiratory:  negative for cough, hemoptysis, dyspnea,wheezing  CV:   negative for chest pain, palpitations, lower extremity edema  GI:   negative for nausea, vomiting, diarrhea, abdominal pain,melena  Endo:               negative for polyuria,polydipsia,polyphagia,heat intolerance  Genitourinary: negative for frequency, dysuria and hematuria  Integument:  negative for rash and pruritus  Hematologic:  negative for easy bruising and gum/nose bleeding  Musculoskel: arthralgias,  joint pain  Neurological:  negative for headaches, dizziness, vertigo, memory problems and gait   Behavl/Psych: negative for feelings of anxiety, depression, mood changes    Past Medical History:   Diagnosis Date    Bunion 7/13/2011    Cellulitis and abscess of unspecified site 7/13/2011    Chronic obstructive pulmonary disease (HCC)     hx bronchitis    Dermatitis due to unspecified substance taken internally 2/7/2011     No past surgical history on file.  Social History     Socioeconomic History    Marital status:    Tobacco Use    Smoking status: Every Day     Current packs/day: 0.25     Types: Cigarettes    Smokeless tobacco: Current

## 2024-09-04 NOTE — PROGRESS NOTES
1. Have you been to the ER, urgent care clinic since your last visit?  Hospitalized since your last visit?No    2. Have you seen or consulted any other health care providers outside of the [facility] since your last visit?  Include any pap smears or colon screening. No     Chief Complaint   Patient presents with    osteoartritis     knees         PHQ-9 Total Score: 0 (9/4/2024 11:11 AM)

## 2024-09-11 ENCOUNTER — OFFICE VISIT (OUTPATIENT)
Facility: CLINIC | Age: 59
End: 2024-09-11
Payer: MEDICAID

## 2024-09-11 VITALS
TEMPERATURE: 98 F | RESPIRATION RATE: 16 BRPM | SYSTOLIC BLOOD PRESSURE: 120 MMHG | BODY MASS INDEX: 28.09 KG/M2 | WEIGHT: 179 LBS | OXYGEN SATURATION: 96 % | HEART RATE: 79 BPM | HEIGHT: 67 IN | DIASTOLIC BLOOD PRESSURE: 80 MMHG

## 2024-09-11 DIAGNOSIS — M25.462 KNEE EFFUSION, LEFT: Primary | ICD-10-CM

## 2024-09-11 DIAGNOSIS — M17.12 PRIMARY OSTEOARTHRITIS OF LEFT KNEE: ICD-10-CM

## 2024-09-11 PROCEDURE — 99214 OFFICE O/P EST MOD 30 MIN: CPT | Performed by: INTERNAL MEDICINE

## 2024-09-11 RX ORDER — TRIAMCINOLONE ACETONIDE 40 MG/ML
40 INJECTION, SUSPENSION INTRA-ARTICULAR; INTRAMUSCULAR ONCE
Status: COMPLETED | OUTPATIENT
Start: 2024-09-11 | End: 2024-09-11

## 2024-09-11 RX ORDER — LIDOCAINE HYDROCHLORIDE 10 MG/ML
5 INJECTION, SOLUTION INFILTRATION; PERINEURAL ONCE
Status: COMPLETED | OUTPATIENT
Start: 2024-09-11 | End: 2024-09-11

## 2024-09-11 RX ADMIN — TRIAMCINOLONE ACETONIDE 40 MG: 40 INJECTION, SUSPENSION INTRA-ARTICULAR; INTRAMUSCULAR at 10:04

## 2024-09-11 RX ADMIN — LIDOCAINE HYDROCHLORIDE 5 ML: 10 INJECTION, SOLUTION INFILTRATION; PERINEURAL at 10:02

## 2024-09-11 ASSESSMENT — PATIENT HEALTH QUESTIONNAIRE - PHQ9
SUM OF ALL RESPONSES TO PHQ QUESTIONS 1-9: 0
SUM OF ALL RESPONSES TO PHQ9 QUESTIONS 1 & 2: 0
SUM OF ALL RESPONSES TO PHQ QUESTIONS 1-9: 0
SUM OF ALL RESPONSES TO PHQ QUESTIONS 1-9: 0
2. FEELING DOWN, DEPRESSED OR HOPELESS: NOT AT ALL
SUM OF ALL RESPONSES TO PHQ QUESTIONS 1-9: 0
1. LITTLE INTEREST OR PLEASURE IN DOING THINGS: NOT AT ALL

## 2024-10-09 ENCOUNTER — OFFICE VISIT (OUTPATIENT)
Facility: CLINIC | Age: 59
End: 2024-10-09

## 2024-10-09 VITALS
SYSTOLIC BLOOD PRESSURE: 126 MMHG | BODY MASS INDEX: 28.56 KG/M2 | OXYGEN SATURATION: 93 % | HEART RATE: 85 BPM | TEMPERATURE: 98 F | HEIGHT: 67 IN | RESPIRATION RATE: 16 BRPM | WEIGHT: 182 LBS | DIASTOLIC BLOOD PRESSURE: 82 MMHG

## 2024-10-09 DIAGNOSIS — J40 BRONCHITIS: ICD-10-CM

## 2024-10-09 DIAGNOSIS — M25.461 KNEE EFFUSION, RIGHT: Primary | ICD-10-CM

## 2024-10-09 RX ORDER — IBUPROFEN 600 MG/1
600 TABLET, FILM COATED ORAL EVERY 6 HOURS PRN
Qty: 20 TABLET | Refills: 1 | Status: SHIPPED | OUTPATIENT
Start: 2024-10-09 | End: 2024-10-19

## 2024-10-09 RX ORDER — TRIAMCINOLONE ACETONIDE 40 MG/ML
40 INJECTION, SUSPENSION INTRA-ARTICULAR; INTRAMUSCULAR ONCE
Status: COMPLETED | OUTPATIENT
Start: 2024-10-09 | End: 2024-10-09

## 2024-10-09 RX ORDER — LIDOCAINE HYDROCHLORIDE 10 MG/ML
5 INJECTION, SOLUTION INFILTRATION; PERINEURAL ONCE
Status: COMPLETED | OUTPATIENT
Start: 2024-10-09 | End: 2024-10-09

## 2024-10-09 RX ADMIN — TRIAMCINOLONE ACETONIDE 40 MG: 40 INJECTION, SUSPENSION INTRA-ARTICULAR; INTRAMUSCULAR at 10:23

## 2024-10-09 RX ADMIN — LIDOCAINE HYDROCHLORIDE 5 ML: 10 INJECTION, SOLUTION INFILTRATION; PERINEURAL at 10:23

## 2024-10-09 ASSESSMENT — PATIENT HEALTH QUESTIONNAIRE - PHQ9
SUM OF ALL RESPONSES TO PHQ QUESTIONS 1-9: 0
1. LITTLE INTEREST OR PLEASURE IN DOING THINGS: NOT AT ALL
SUM OF ALL RESPONSES TO PHQ QUESTIONS 1-9: 0
SUM OF ALL RESPONSES TO PHQ9 QUESTIONS 1 & 2: 0
SUM OF ALL RESPONSES TO PHQ QUESTIONS 1-9: 0
SUM OF ALL RESPONSES TO PHQ9 QUESTIONS 1 & 2: 0
2. FEELING DOWN, DEPRESSED OR HOPELESS: NOT AT ALL
SUM OF ALL RESPONSES TO PHQ QUESTIONS 1-9: 0
SUM OF ALL RESPONSES TO PHQ QUESTIONS 1-9: 0
1. LITTLE INTEREST OR PLEASURE IN DOING THINGS: NOT AT ALL
2. FEELING DOWN, DEPRESSED OR HOPELESS: NOT AT ALL

## 2024-10-09 NOTE — PROGRESS NOTES
1. Have you been to the ER, urgent care clinic since your last visit?  Hospitalized since your last visit?No    2. Have you seen or consulted any other health care providers outside of the [facility] since your last visit?  Include any pap smears or colon screening. No     Chief Complaint   Patient presents with    Knee Pain     bilateral         PHQ-9 Total Score: 0 (10/9/2024  8:41 AM)

## 2024-10-09 NOTE — PROGRESS NOTES
Grace Taylor is a 59 y.o. female and presents with Knee Pain (bilateral) and Other (Handicap paper)  .  Subjective:  Knee pain Review:  Patient complains of bilaterally knee pain with effusion. Onset of the symptoms was months  ago. Inciting event: longstanding problem which has been getting worse. Current symptoms include pain location: both: medial and swelling. none,  Aggravating symptoms: going up and down stairs, walking, lateral movements, any weight bearing. Patient's overall course: gradually worsening Patient has had prior knee problems. Evaluation to date: none.  Treatment to date:NSAIDS     COPD REVIEW:  The patient is being seen for follow up of COPD,the patient has been stable  Oxygen: She currently is not on home oxygen therapy.  Symptoms: chronic dyspnea: severity = not present: course of sx: stable.   Patient uses 2 pillows at night.   Patient does continue to smoke cigarettes.             Review of Systems  Constitutional: negative for fevers, chills, anorexia and weight loss  Eyes:   negative for visual disturbance and irritation  ENT:   negative for tinnitus,sore throat,nasal congestion,ear pains.hoarseness  Respiratory:  negative for cough, hemoptysis, dyspnea,wheezing  CV:   negative for chest pain, palpitations, lower extremity edema  GI:   negative for nausea, vomiting, diarrhea, abdominal pain,melena  Endo:               negative for polyuria,polydipsia,polyphagia,heat intolerance  Genitourinary: negative for frequency, dysuria and hematuria  Integument:  negative for rash and pruritus  Hematologic:  negative for easy bruising and gum/nose bleeding  Musculoskel: oint pain  Neurological:  negative for headaches, dizziness, vertigo, memory problems and gait   Behavl/Psych: negative for feelings of anxiety, depression, mood changes    Past Medical History:   Diagnosis Date    Bunion 7/13/2011    Cellulitis and abscess of unspecified site 7/13/2011    Chronic obstructive pulmonary disease

## 2024-10-23 ENCOUNTER — NURSE ONLY (OUTPATIENT)
Facility: CLINIC | Age: 59
End: 2024-10-23

## 2024-10-23 NOTE — PROGRESS NOTES
Grace Taylor is a 59 y.o. female  who presents for routine immunizations.   He denies any symptoms , reactions or allergies that would exclude them from being immunized today.  Risks and adverse reactions were discussed and the VIS was given to them. All questions were addressed.  He was observed for 5 min post injection. There were no reactions observed.    Gera Dela Cruz LPN

## 2024-11-06 ENCOUNTER — OFFICE VISIT (OUTPATIENT)
Facility: CLINIC | Age: 59
End: 2024-11-06
Payer: MEDICAID

## 2024-11-06 VITALS
RESPIRATION RATE: 16 BRPM | DIASTOLIC BLOOD PRESSURE: 76 MMHG | TEMPERATURE: 97.8 F | HEIGHT: 67 IN | HEART RATE: 81 BPM | BODY MASS INDEX: 28.51 KG/M2 | OXYGEN SATURATION: 94 % | SYSTOLIC BLOOD PRESSURE: 124 MMHG

## 2024-11-06 DIAGNOSIS — K02.9 PAIN DUE TO DENTAL CARIES: ICD-10-CM

## 2024-11-06 DIAGNOSIS — M25.462 KNEE EFFUSION, LEFT: ICD-10-CM

## 2024-11-06 DIAGNOSIS — M25.461 KNEE EFFUSION, RIGHT: Primary | ICD-10-CM

## 2024-11-06 PROCEDURE — 99214 OFFICE O/P EST MOD 30 MIN: CPT | Performed by: INTERNAL MEDICINE

## 2024-11-06 RX ORDER — CEPHALEXIN 500 MG/1
500 CAPSULE ORAL 4 TIMES DAILY
Qty: 28 CAPSULE | Refills: 0 | Status: SHIPPED | OUTPATIENT
Start: 2024-11-06 | End: 2024-11-13

## 2024-11-06 RX ORDER — IBUPROFEN 600 MG/1
600 TABLET, FILM COATED ORAL EVERY 6 HOURS PRN
Qty: 20 TABLET | Refills: 1 | Status: SHIPPED | OUTPATIENT
Start: 2024-11-06 | End: 2024-11-16

## 2024-11-06 ASSESSMENT — PATIENT HEALTH QUESTIONNAIRE - PHQ9
2. FEELING DOWN, DEPRESSED OR HOPELESS: NOT AT ALL
SUM OF ALL RESPONSES TO PHQ9 QUESTIONS 1 & 2: 0
SUM OF ALL RESPONSES TO PHQ QUESTIONS 1-9: 0
2. FEELING DOWN, DEPRESSED OR HOPELESS: NOT AT ALL
1. LITTLE INTEREST OR PLEASURE IN DOING THINGS: NOT AT ALL
SUM OF ALL RESPONSES TO PHQ QUESTIONS 1-9: 0
SUM OF ALL RESPONSES TO PHQ9 QUESTIONS 1 & 2: 0
SUM OF ALL RESPONSES TO PHQ QUESTIONS 1-9: 0
1. LITTLE INTEREST OR PLEASURE IN DOING THINGS: NOT AT ALL

## 2024-11-06 NOTE — PROGRESS NOTES
1. Have you been to the ER, urgent care clinic since your last visit?  Hospitalized since your last visit?No    2. Have you seen or consulted any other health care providers outside of the [facility] since your last visit?  Include any pap smears or colon screening. No     Chief Complaint   Patient presents with    Knee Pain     bilateral         PHQ-9 Total Score: 0 (11/6/2024 10:48 AM)    
details of the assessment and plan and all questions were answered. Relevent patient education was performed.The most recent lab findings were reviewed with the patient.    An After Visit Summary was printed and given to the patient.

## 2024-12-20 ENCOUNTER — OFFICE VISIT (OUTPATIENT)
Age: 59
End: 2024-12-20

## 2024-12-20 VITALS
DIASTOLIC BLOOD PRESSURE: 78 MMHG | RESPIRATION RATE: 16 BRPM | HEART RATE: 90 BPM | OXYGEN SATURATION: 96 % | TEMPERATURE: 97.7 F | SYSTOLIC BLOOD PRESSURE: 122 MMHG | BODY MASS INDEX: 28.04 KG/M2 | WEIGHT: 179 LBS

## 2024-12-20 DIAGNOSIS — K08.89 PAIN IN A TOOTH OR TEETH: Primary | ICD-10-CM

## 2024-12-20 RX ORDER — IBUPROFEN 800 MG/1
800 TABLET, FILM COATED ORAL 2 TIMES DAILY PRN
Qty: 60 TABLET | Refills: 0 | Status: SHIPPED | OUTPATIENT
Start: 2024-12-20

## 2024-12-20 RX ORDER — PREDNISONE 20 MG/1
20 TABLET ORAL 2 TIMES DAILY
Qty: 8 TABLET | Refills: 0 | Status: SHIPPED | OUTPATIENT
Start: 2024-12-20 | End: 2024-12-24

## 2024-12-20 RX ORDER — AMOXICILLIN 875 MG/1
875 TABLET, COATED ORAL 2 TIMES DAILY
COMMUNITY

## 2024-12-20 ASSESSMENT — ENCOUNTER SYMPTOMS
WHEEZING: 0
TROUBLE SWALLOWING: 0
COUGH: 0
GASTROINTESTINAL NEGATIVE: 1
SINUS PRESSURE: 0
RHINORRHEA: 0
SORE THROAT: 1
CHEST TIGHTNESS: 0

## 2024-12-20 NOTE — PATIENT INSTRUCTIONS
Thank you for visiting our Urgent Care for you immediate health concerns.  Please take medications as prescribed today. If symptoms worsen follow up with your PCP or return to Urgent Care.  Please call [phone] if you develop shortness or breath or chest pain.

## 2024-12-20 NOTE — PROGRESS NOTES
Chief Complaint   Patient presents with    Dental Pain     Seen at dentist Wednesday and all bottom teeth pulled. In pain.          Dental Pain   Pertinent negatives include no fever or sinus pressure.       Patient is presenting to urgent care due to having all her bottom teeth pulled and she states she is in pain and would like pain medication.  Patient states she was given ibuprofen 600 mg but it is not helping.  Patient denies any chest pain, shortness of breath or vision changes.    Past Medical History:   Diagnosis Date    Bunion 7/13/2011    Cellulitis and abscess of unspecified site 7/13/2011    Chronic obstructive pulmonary disease (HCC)     hx bronchitis    Dermatitis due to unspecified substance taken internally 2/7/2011       No past surgical history on file.    Social History     Tobacco Use    Smoking status: Every Day     Current packs/day: 0.25     Types: Cigarettes    Smokeless tobacco: Current   Vaping Use    Vaping status: Never Used   Substance Use Topics    Alcohol use: No    Drug use: No        No Known Allergies     Review of Systems   Constitutional:  Negative for chills, fatigue and fever.   HENT:  Positive for dental problem and sore throat. Negative for congestion, ear pain, postnasal drip, rhinorrhea, sinus pressure and trouble swallowing.    Respiratory:  Negative for cough, chest tightness and wheezing.    Cardiovascular:  Negative for chest pain and palpitations.   Gastrointestinal: Negative.    Neurological: Negative.         /78 (Site: Left Upper Arm, Position: Sitting, Cuff Size: Medium Adult)   Pulse 90   Temp 97.7 °F (36.5 °C) (Temporal)   Resp 16   Wt 81.2 kg (179 lb)   SpO2 96%   BMI 28.04 kg/m²      Physical Exam  Constitutional:       Appearance: Normal appearance.   HENT:      Right Ear: Tympanic membrane normal. There is no impacted cerumen.      Left Ear: Tympanic membrane normal. There is no impacted cerumen.      Nose: Nose normal. No congestion or rhinorrhea.

## 2025-03-03 ENCOUNTER — OFFICE VISIT (OUTPATIENT)
Facility: CLINIC | Age: 60
End: 2025-03-03

## 2025-03-03 VITALS
TEMPERATURE: 98 F | DIASTOLIC BLOOD PRESSURE: 88 MMHG | RESPIRATION RATE: 16 BRPM | SYSTOLIC BLOOD PRESSURE: 116 MMHG | WEIGHT: 178 LBS | BODY MASS INDEX: 27.94 KG/M2 | OXYGEN SATURATION: 93 % | HEIGHT: 67 IN | HEART RATE: 78 BPM

## 2025-03-03 DIAGNOSIS — M17.12 PRIMARY OSTEOARTHRITIS OF LEFT KNEE: ICD-10-CM

## 2025-03-03 DIAGNOSIS — M25.462 KNEE EFFUSION, LEFT: Primary | ICD-10-CM

## 2025-03-03 RX ORDER — TRIAMCINOLONE ACETONIDE 40 MG/ML
40 INJECTION, SUSPENSION INTRA-ARTICULAR; INTRAMUSCULAR ONCE
Status: COMPLETED | OUTPATIENT
Start: 2025-03-03 | End: 2025-03-03

## 2025-03-03 RX ORDER — LIDOCAINE HYDROCHLORIDE 10 MG/ML
5 INJECTION, SOLUTION INFILTRATION; PERINEURAL ONCE
Status: COMPLETED | OUTPATIENT
Start: 2025-03-03 | End: 2025-03-03

## 2025-03-03 RX ADMIN — LIDOCAINE HYDROCHLORIDE 50 ML: 10 INJECTION, SOLUTION INFILTRATION; PERINEURAL at 14:34

## 2025-03-03 RX ADMIN — TRIAMCINOLONE ACETONIDE 40 MG: 40 INJECTION, SUSPENSION INTRA-ARTICULAR; INTRAMUSCULAR at 14:33

## 2025-03-03 SDOH — ECONOMIC STABILITY: FOOD INSECURITY: WITHIN THE PAST 12 MONTHS, YOU WORRIED THAT YOUR FOOD WOULD RUN OUT BEFORE YOU GOT MONEY TO BUY MORE.: NEVER TRUE

## 2025-03-03 SDOH — ECONOMIC STABILITY: FOOD INSECURITY: WITHIN THE PAST 12 MONTHS, THE FOOD YOU BOUGHT JUST DIDN'T LAST AND YOU DIDN'T HAVE MONEY TO GET MORE.: NEVER TRUE

## 2025-03-03 ASSESSMENT — PATIENT HEALTH QUESTIONNAIRE - PHQ9
1. LITTLE INTEREST OR PLEASURE IN DOING THINGS: NOT AT ALL
SUM OF ALL RESPONSES TO PHQ QUESTIONS 1-9: 0
2. FEELING DOWN, DEPRESSED OR HOPELESS: NOT AT ALL
SUM OF ALL RESPONSES TO PHQ QUESTIONS 1-9: 0
1. LITTLE INTEREST OR PLEASURE IN DOING THINGS: NOT AT ALL
2. FEELING DOWN, DEPRESSED OR HOPELESS: NOT AT ALL

## 2025-03-03 NOTE — PROGRESS NOTES
1. Have you been to the ER, urgent care clinic since your last visit?  Hospitalized since your last visit?No    2. Have you seen or consulted any other health care providers outside of the [facility] since your last visit?  Include any pap smears or colon screening. No     Chief Complaint   Patient presents with    Joint Swelling     knee         PHQ-9 Total Score: 0 (3/3/2025 10:10 AM)    
to rest the joint for a few more days before resuming regular activities.  It may be more painful for the first 1-2 days.  Watch for fever, or increased swelling or persistent pain in the joint. Call or return to clinic prn if such symptoms occur or there is failure to improve as anticipated.      OFFICE PROCEDURE PROGRESS NOTE        Chart reviewed for the following:   Everett RICKETTS MD, have reviewed the History, Physical and updated the Allergic reactions for April K Birdsong     TIME OUT performed immediately prior to start of procedure:   Everett RICKETTS MD, have performed the following reviews on April K Birdsong prior to the start of the procedure:            * Patient was identified by name and date of birth   * Agreement on procedure being performed was verified  * Risks and Benefits explained to the patient  * Procedure site verified and marked as necessary  * Patient was positioned for comfort       Time: 10:54am      Date of procedure: 3/3/2025    Procedure performed by:  Everett Woodard MD    Patient assisted by: nursing attendant    How tolerated by patient: tolerated the procedure well with no complications    Comments: none             Assessment/Plan:  No diagnosis found.  No orders of the defined types were placed in this encounter.    follow low fat diet, follow low salt diet, routine labs ordered, call if any problems,  There are no Patient Instructions on file for this visit.         I have reviewed with the patient details of the assessment and plan and all questions were answered. Relevent patient education was performed.The most recent lab findings were reviewed with the patient.    An After Visit Summary was printed and given to the patient.

## 2025-04-02 ENCOUNTER — OFFICE VISIT (OUTPATIENT)
Facility: CLINIC | Age: 60
End: 2025-04-02
Payer: MEDICAID

## 2025-04-02 VITALS
SYSTOLIC BLOOD PRESSURE: 110 MMHG | RESPIRATION RATE: 16 BRPM | HEIGHT: 67 IN | BODY MASS INDEX: 27.47 KG/M2 | WEIGHT: 175 LBS | DIASTOLIC BLOOD PRESSURE: 76 MMHG | OXYGEN SATURATION: 93 % | HEART RATE: 90 BPM | TEMPERATURE: 98 F

## 2025-04-02 DIAGNOSIS — M25.461 KNEE EFFUSION, RIGHT: Primary | ICD-10-CM

## 2025-04-02 DIAGNOSIS — Z12.11 SCREENING FOR COLON CANCER: ICD-10-CM

## 2025-04-02 DIAGNOSIS — Z11.4 SCREENING FOR HIV (HUMAN IMMUNODEFICIENCY VIRUS): ICD-10-CM

## 2025-04-02 DIAGNOSIS — Z12.31 ENCOUNTER FOR SCREENING MAMMOGRAM FOR MALIGNANT NEOPLASM OF BREAST: ICD-10-CM

## 2025-04-02 PROCEDURE — 99214 OFFICE O/P EST MOD 30 MIN: CPT | Performed by: INTERNAL MEDICINE

## 2025-04-02 PROCEDURE — 96372 THER/PROPH/DIAG INJ SC/IM: CPT | Performed by: INTERNAL MEDICINE

## 2025-04-02 RX ORDER — TRIAMCINOLONE ACETONIDE 40 MG/ML
40 INJECTION, SUSPENSION INTRA-ARTICULAR; INTRAMUSCULAR ONCE
Status: COMPLETED | OUTPATIENT
Start: 2025-04-02 | End: 2025-04-02

## 2025-04-02 RX ORDER — LIDOCAINE HYDROCHLORIDE 10 MG/ML
5 INJECTION, SOLUTION INFILTRATION; PERINEURAL ONCE
Status: COMPLETED | OUTPATIENT
Start: 2025-04-02 | End: 2025-04-02

## 2025-04-02 RX ADMIN — TRIAMCINOLONE ACETONIDE 40 MG: 40 INJECTION, SUSPENSION INTRA-ARTICULAR; INTRAMUSCULAR at 10:19

## 2025-04-02 RX ADMIN — LIDOCAINE HYDROCHLORIDE 5 ML: 10 INJECTION, SOLUTION INFILTRATION; PERINEURAL at 10:18

## 2025-04-02 ASSESSMENT — PATIENT HEALTH QUESTIONNAIRE - PHQ9
SUM OF ALL RESPONSES TO PHQ QUESTIONS 1-9: 0
1. LITTLE INTEREST OR PLEASURE IN DOING THINGS: NOT AT ALL
SUM OF ALL RESPONSES TO PHQ QUESTIONS 1-9: 0
SUM OF ALL RESPONSES TO PHQ QUESTIONS 1-9: 0
2. FEELING DOWN, DEPRESSED OR HOPELESS: NOT AT ALL
SUM OF ALL RESPONSES TO PHQ QUESTIONS 1-9: 0

## 2025-04-02 NOTE — PROGRESS NOTES
Grace Taylor is a 59 y.o. female and presents with Knee Pain  .  Subjective:    Knee pain Review:  Patient complains of bilaterally knee pain. Onset of the symptoms was months  ago. Inciting event: longstanding problem which has been getting worse. Current symptoms include pain location: both: medial and swelling. none,  Aggravating symptoms: going up and down stairs, walking, lateral movements, any weight bearing. Patient's overall course: gradually worsening Patient has had prior knee problems. Evaluation to date: effusion/steroid injection  Treatment to date:NSAIDS          Health maintenance suggests the needs for a test for occult blood      Health maintenance suggest the need for hiv      Health maintenance suggests the needs for a mammogram      Review of Systems  Constitutional: negative for fevers, chills, anorexia and weight loss  Eyes:   negative for visual disturbance and irritation  ENT:   negative for tinnitus,sore throat,nasal congestion,ear pains.hoarseness  Respiratory:  negative for cough, hemoptysis, dyspnea,wheezing  CV:   negative for chest pain, palpitations, lower extremity edema  GI:   negative for nausea, vomiting, diarrhea, abdominal pain,melena  Endo:               negative for polyuria,polydipsia,polyphagia,heat intolerance  Genitourinary: negative for frequency, dysuria and hematuria  Integument:  negative for rash and pruritus  Hematologic:  negative for easy bruising and gum/nose bleeding  Musculoskel:  arthralgias, joint pain  Neurological:  negative for headaches, dizziness, vertigo, memory problems and gait   Behavl/Psych: negative for feelings of anxiety, depression, mood changes    Past Medical History:   Diagnosis Date    Bunion 7/13/2011    Cellulitis and abscess of unspecified site 7/13/2011    Chronic obstructive pulmonary disease (HCC)     hx bronchitis    Dermatitis due to unspecified substance taken internally 2/7/2011     No past surgical history on file.  Social

## 2025-04-02 NOTE — PROGRESS NOTES
1. Have you been to the ER, urgent care clinic since your last visit?  Hospitalized since your last visit?No    2. Have you seen or consulted any other health care providers outside of the [facility] since your last visit?  Include any pap smears or colon screening. No     Chief Complaint   Patient presents with    Knee Pain         PHQ-9 Total Score: 0 (4/2/2025  9:38 AM)

## 2025-04-03 LAB
ALBUMIN SERPL-MCNC: 4.3 G/DL (ref 3.8–4.9)
ALP SERPL-CCNC: 141 IU/L (ref 44–121)
ALT SERPL-CCNC: 11 IU/L (ref 0–32)
AST SERPL-CCNC: 25 IU/L (ref 0–40)
BILIRUB SERPL-MCNC: <0.2 MG/DL (ref 0–1.2)
BUN SERPL-MCNC: 12 MG/DL (ref 8–27)
BUN/CREAT SERPL: 13 (ref 12–28)
CALCIUM SERPL-MCNC: 9.6 MG/DL (ref 8.7–10.3)
CHLORIDE SERPL-SCNC: 99 MMOL/L (ref 96–106)
CO2 SERPL-SCNC: 21 MMOL/L (ref 20–29)
CREAT SERPL-MCNC: 0.94 MG/DL (ref 0.57–1)
EGFRCR SERPLBLD CKD-EPI 2021: 69 ML/MIN/1.73
ERYTHROCYTE [DISTWIDTH] IN BLOOD BY AUTOMATED COUNT: 13 % (ref 11.7–15.4)
GLOBULIN SER CALC-MCNC: 3.6 G/DL (ref 1.5–4.5)
GLUCOSE SERPL-MCNC: 86 MG/DL (ref 70–99)
HCT VFR BLD AUTO: 42.8 % (ref 34–46.6)
HGB BLD-MCNC: 14.2 G/DL (ref 11.1–15.9)
HIV 1+2 AB+HIV1 P24 AG SERPL QL IA: NON REACTIVE
MCH RBC QN AUTO: 30.7 PG (ref 26.6–33)
MCHC RBC AUTO-ENTMCNC: 33.2 G/DL (ref 31.5–35.7)
MCV RBC AUTO: 93 FL (ref 79–97)
PLATELET # BLD AUTO: 234 X10E3/UL (ref 150–450)
POTASSIUM SERPL-SCNC: 6.1 MMOL/L (ref 3.5–5.2)
PROT SERPL-MCNC: 7.9 G/DL (ref 6–8.5)
RBC # BLD AUTO: 4.62 X10E6/UL (ref 3.77–5.28)
SODIUM SERPL-SCNC: 140 MMOL/L (ref 134–144)
WBC # BLD AUTO: 8.5 X10E3/UL (ref 3.4–10.8)

## 2025-05-01 ENCOUNTER — OFFICE VISIT (OUTPATIENT)
Facility: CLINIC | Age: 60
End: 2025-05-01
Payer: MEDICAID

## 2025-05-01 VITALS
SYSTOLIC BLOOD PRESSURE: 130 MMHG | DIASTOLIC BLOOD PRESSURE: 70 MMHG | HEART RATE: 78 BPM | TEMPERATURE: 98 F | HEIGHT: 67 IN | BODY MASS INDEX: 25.9 KG/M2 | WEIGHT: 165 LBS | OXYGEN SATURATION: 93 % | RESPIRATION RATE: 20 BRPM

## 2025-05-01 DIAGNOSIS — M25.462 EFFUSION OF BOTH KNEE JOINTS: Primary | ICD-10-CM

## 2025-05-01 DIAGNOSIS — M25.461 EFFUSION OF BOTH KNEE JOINTS: Primary | ICD-10-CM

## 2025-05-01 DIAGNOSIS — J40 BRONCHITIS: ICD-10-CM

## 2025-05-01 PROCEDURE — 99213 OFFICE O/P EST LOW 20 MIN: CPT | Performed by: INTERNAL MEDICINE

## 2025-05-01 RX ORDER — ALBUTEROL SULFATE 90 UG/1
2 POWDER, METERED RESPIRATORY (INHALATION) EVERY 4 HOURS PRN
Qty: 1 EACH | Refills: 12 | Status: SHIPPED | OUTPATIENT
Start: 2025-05-01

## 2025-05-01 RX ORDER — CELECOXIB 200 MG/1
200 CAPSULE ORAL DAILY
Qty: 30 CAPSULE | Refills: 0 | Status: SHIPPED | OUTPATIENT
Start: 2025-05-01

## 2025-05-01 SDOH — ECONOMIC STABILITY: FOOD INSECURITY: WITHIN THE PAST 12 MONTHS, THE FOOD YOU BOUGHT JUST DIDN'T LAST AND YOU DIDN'T HAVE MONEY TO GET MORE.: NEVER TRUE

## 2025-05-01 SDOH — ECONOMIC STABILITY: FOOD INSECURITY: WITHIN THE PAST 12 MONTHS, YOU WORRIED THAT YOUR FOOD WOULD RUN OUT BEFORE YOU GOT MONEY TO BUY MORE.: NEVER TRUE

## 2025-05-01 ASSESSMENT — ANXIETY QUESTIONNAIRES
5. BEING SO RESTLESS THAT IT IS HARD TO SIT STILL: NOT AT ALL
2. NOT BEING ABLE TO STOP OR CONTROL WORRYING: NOT AT ALL
7. FEELING AFRAID AS IF SOMETHING AWFUL MIGHT HAPPEN: NOT AT ALL
1. FEELING NERVOUS, ANXIOUS, OR ON EDGE: NOT AT ALL
IF YOU CHECKED OFF ANY PROBLEMS ON THIS QUESTIONNAIRE, HOW DIFFICULT HAVE THESE PROBLEMS MADE IT FOR YOU TO DO YOUR WORK, TAKE CARE OF THINGS AT HOME, OR GET ALONG WITH OTHER PEOPLE: NOT DIFFICULT AT ALL
6. BECOMING EASILY ANNOYED OR IRRITABLE: NOT AT ALL
3. WORRYING TOO MUCH ABOUT DIFFERENT THINGS: NOT AT ALL
4. TROUBLE RELAXING: NOT AT ALL
GAD7 TOTAL SCORE: 0

## 2025-05-01 ASSESSMENT — PATIENT HEALTH QUESTIONNAIRE - PHQ9
1. LITTLE INTEREST OR PLEASURE IN DOING THINGS: NOT AT ALL
SUM OF ALL RESPONSES TO PHQ QUESTIONS 1-9: 0
2. FEELING DOWN, DEPRESSED OR HOPELESS: NOT AT ALL

## 2025-05-01 NOTE — PROGRESS NOTES
Have you been to the ER, urgent care clinic since your last visit?  Hospitalized since your last visit?   no    Have you seen or consulted any other health care providers outside our system since your last visit?   no    Have you had a mammogram?”   no    Date of last Mammogram: 4/18/2023      “Have you had a pap smear?”    no    Date of last Cervical Cancer screen (HPV or PAP): 11/17/2016       “Have you had a colorectal cancer screening such as a colonoscopy/FIT/Cologuard?    no    No colonoscopy on file  No cologuard on file  No FIT/FOBT on file   No flexible sigmoidoscopy on file            
joints  M25.461 External Referral To Orthopedic Surgery    M25.462 Rheumatoid Arthritis Diagnostic Panel      2. Bronchitis  J40         Orders Placed This Encounter   Procedures    Rheumatoid Arthritis Diagnostic Panel     Standing Status:   Future     Expected Date:   5/1/2025     Expiration Date:   5/1/2026    External Referral To Orthopedic Surgery     Referral Priority:   Routine     Referral Type:   Consult for Advice and Opinion     Referral Reason:   Specialty Services Required     Referred to Provider:   Alexandria Villatoro MD     Requested Specialty:   Orthopedic Surgery     Number of Visits Requested:   1     call if any problems,  There are no Patient Instructions on file for this visit.   Follow-up and Dispositions    Return in about 4 weeks (around 5/29/2025), or if symptoms worsen or fail to improve.           I have reviewed with the patient details of the assessment and plan and all questions were answered. Relevent patient education was performed.The most recent lab findings were reviewed with the patient.    An After Visit Summary was printed and given to the patient.

## 2025-05-05 LAB
CCP IGA+IGG SERPL IA-ACNC: 9 UNITS (ref 0–19)
RHEUMATOID FACT SERPL-ACNC: 44.9 IU/ML

## 2025-05-30 ENCOUNTER — OFFICE VISIT (OUTPATIENT)
Facility: CLINIC | Age: 60
End: 2025-05-30
Payer: MEDICAID

## 2025-05-30 VITALS
SYSTOLIC BLOOD PRESSURE: 120 MMHG | BODY MASS INDEX: 26.68 KG/M2 | HEIGHT: 67 IN | WEIGHT: 170 LBS | TEMPERATURE: 98.4 F | RESPIRATION RATE: 20 BRPM | HEART RATE: 71 BPM | OXYGEN SATURATION: 98 % | DIASTOLIC BLOOD PRESSURE: 80 MMHG

## 2025-05-30 DIAGNOSIS — M25.462 EFFUSION OF BOTH KNEE JOINTS: Primary | ICD-10-CM

## 2025-05-30 DIAGNOSIS — M25.461 EFFUSION OF BOTH KNEE JOINTS: Primary | ICD-10-CM

## 2025-05-30 DIAGNOSIS — J41.0 SIMPLE CHRONIC BRONCHITIS (HCC): ICD-10-CM

## 2025-05-30 PROCEDURE — 99214 OFFICE O/P EST MOD 30 MIN: CPT | Performed by: INTERNAL MEDICINE

## 2025-05-30 RX ORDER — LIDOCAINE HYDROCHLORIDE 10 MG/ML
2 INJECTION, SOLUTION INFILTRATION; PERINEURAL ONCE
OUTPATIENT
Start: 2025-05-30 | End: 2025-05-30

## 2025-05-30 RX ORDER — TRIAMCINOLONE ACETONIDE 40 MG/ML
40 INJECTION, SUSPENSION INTRA-ARTICULAR; INTRAMUSCULAR ONCE
OUTPATIENT
Start: 2025-05-30 | End: 2025-05-30

## 2025-05-30 SDOH — ECONOMIC STABILITY: FOOD INSECURITY: WITHIN THE PAST 12 MONTHS, THE FOOD YOU BOUGHT JUST DIDN'T LAST AND YOU DIDN'T HAVE MONEY TO GET MORE.: NEVER TRUE

## 2025-05-30 SDOH — ECONOMIC STABILITY: FOOD INSECURITY: WITHIN THE PAST 12 MONTHS, YOU WORRIED THAT YOUR FOOD WOULD RUN OUT BEFORE YOU GOT MONEY TO BUY MORE.: NEVER TRUE

## 2025-05-30 ASSESSMENT — PATIENT HEALTH QUESTIONNAIRE - PHQ9
2. FEELING DOWN, DEPRESSED OR HOPELESS: NOT AT ALL
1. LITTLE INTEREST OR PLEASURE IN DOING THINGS: NOT AT ALL
SUM OF ALL RESPONSES TO PHQ QUESTIONS 1-9: 0

## 2025-05-30 NOTE — PROGRESS NOTES
Have you been to the ER, urgent care clinic since your last visit?  Hospitalized since your last visit?   no    Have you seen or consulted any other health care providers outside our system since your last visit?   no    Have you had a mammogram?”   no    Date of last Mammogram: 4/18/2023      “Have you had a pap smear?”    no    Date of last Cervical Cancer screen (HPV or PAP): 11/17/2016       “Have you had a colorectal cancer screening such as a colonoscopy/FIT/Cologuard?    no    No colonoscopy on file  No cologuard on file  No FIT/FOBT on file   No flexible sigmoidoscopy on file            
Effusion of both knee joints  M25.461 Amb External Referral To Orthopedic Surgery    M25.462 Uric Acid     AMB POC US DRAIN/INJECT LARGE JOINT/BURSA      2. Simple chronic bronchitis (HCC)  J41.0           Orders Placed This Encounter   Procedures    AMB POC US DRAIN/INJECT LARGE JOINT/BURSA    Uric Acid     Standing Status:   Future     Expected Date:   5/30/2025     Expiration Date:   5/30/2026    Amb External Referral To Orthopedic Surgery     Referral Priority:   Routine     Referral Type:   Consult for Advice and Opinion     Referral Reason:   Specialty Services Required     Referred to Provider:   Alexandria Villatoro MD     Requested Specialty:   Orthopedic Surgery     Number of Visits Requested:   1     call if any problems,  There are no Patient Instructions on file for this visit.       Indications:   Symptomatic relief of large effusion/pain    Procedure:  After consent was obtained, using sterile technique the right knee joint was prepped using alcohol. Local anesthetic used: 1% lidocaine.. The joint was entered and 33 ml's of straw colored/clear fluid was withdrawn and discarded.     Kenalog 40 mg was mixed with 1% lidocaine 3 ml  and injected into the joint and the needle withdrawn.  The procedure was well tolerated.  The patient is asked to continue to rest the joint for a few more days before resuming regular activities.  It may be more painful for the first 1-2 days.  Watch for fever, or increased swelling or persistent pain in the joint. Call or return to clinic prn if such symptoms occur or there is failure to improve as anticipated.      OFFICE PROCEDURE PROGRESS NOTE        Chart reviewed for the following:   Everett RICKETTS MD, have reviewed the History, Physical and updated the Allergic reactions for April K Birdsong     TIME OUT performed immediately prior to start of procedure:   Everett RICKETTS MD, have performed the following reviews on April K Birdsong prior to the start of the

## 2025-05-31 LAB — URATE SERPL-MCNC: 4 MG/DL (ref 3–7.2)

## 2025-06-17 ENCOUNTER — OFFICE VISIT (OUTPATIENT)
Facility: CLINIC | Age: 60
End: 2025-06-17
Payer: MEDICAID

## 2025-06-17 ENCOUNTER — HOSPITAL ENCOUNTER (OUTPATIENT)
Facility: HOSPITAL | Age: 60
Discharge: HOME | End: 2025-06-20
Payer: MEDICAID

## 2025-06-17 VITALS
HEART RATE: 67 BPM | BODY MASS INDEX: 26.68 KG/M2 | SYSTOLIC BLOOD PRESSURE: 129 MMHG | WEIGHT: 170 LBS | DIASTOLIC BLOOD PRESSURE: 76 MMHG | HEIGHT: 67 IN | OXYGEN SATURATION: 92 % | RESPIRATION RATE: 18 BRPM

## 2025-06-17 DIAGNOSIS — J41.0 SIMPLE CHRONIC BRONCHITIS (HCC): Primary | ICD-10-CM

## 2025-06-17 DIAGNOSIS — J41.0 SIMPLE CHRONIC BRONCHITIS (HCC): ICD-10-CM

## 2025-06-17 DIAGNOSIS — F17.210 SMOKING GREATER THAN 30 PACK YEARS: ICD-10-CM

## 2025-06-17 PROCEDURE — 71046 X-RAY EXAM CHEST 2 VIEWS: CPT

## 2025-06-17 PROCEDURE — 99213 OFFICE O/P EST LOW 20 MIN: CPT | Performed by: INTERNAL MEDICINE

## 2025-06-17 RX ORDER — BENZONATATE 200 MG/1
CAPSULE ORAL
COMMUNITY
Start: 2025-06-14

## 2025-06-17 RX ORDER — LEVOFLOXACIN 500 MG/1
500 TABLET, FILM COATED ORAL DAILY
Qty: 7 TABLET | Refills: 0 | Status: SHIPPED | OUTPATIENT
Start: 2025-06-17 | End: 2025-06-24

## 2025-06-17 RX ORDER — BUDESONIDE AND FORMOTEROL FUMARATE DIHYDRATE 160; 4.5 UG/1; UG/1
AEROSOL RESPIRATORY (INHALATION)
COMMUNITY
Start: 2025-06-14

## 2025-06-17 RX ORDER — PREDNISONE 20 MG/1
20 TABLET ORAL 2 TIMES DAILY
Qty: 10 TABLET | Refills: 0 | Status: SHIPPED | OUTPATIENT
Start: 2025-06-17 | End: 2025-06-22

## 2025-06-17 NOTE — PROGRESS NOTES
Grace Taylor is a 60 y.o. female and presents with Breathing Problem (Acute Bronchitis )  .  Subjective:  Acute Bronchitis  Patient presents for presents evaluation of wheezing. Symptoms began a few days ago and are unchanged since that time.  Past history is significant for chronic bronchitis.  She was seen at an urgent care and released.      Review of Systems  Constitutional: negative for fevers, chills, anorexia and weight loss  Eyes:   negative for visual disturbance and irritation  ENT:   negative for tinnitus,sore throat,nasal congestion,ear pains.hoarseness  Respiratory:  wheezing  CV:   negative for chest pain, palpitations, lower extremity edema  GI:   negative for nausea, vomiting, diarrhea, abdominal pain,melena  Endo:               negative for polyuria,polydipsia,polyphagia,heat intolerance  Genitourinary: negative for frequency, dysuria and hematuria  Integument:  negative for rash and pruritus  Hematologic:  negative for easy bruising and gum/nose bleeding  Musculoskel: negative for myalgias, arthralgias, back pain, muscle weakness, joint pain  Neurological:  negative for headaches, dizziness, vertigo, memory problems and gait   Behavl/Psych: negative for feelings of anxiety, depression, mood changes    Past Medical History:   Diagnosis Date    Bunion 7/13/2011    Cellulitis and abscess of unspecified site 7/13/2011    Chronic obstructive pulmonary disease (HCC)     hx bronchitis    Dermatitis due to unspecified substance taken internally 2/7/2011     No past surgical history on file.  Social History     Socioeconomic History    Marital status:      Spouse name: None    Number of children: None    Years of education: None    Highest education level: None   Tobacco Use    Smoking status: Every Day     Current packs/day: 0.50     Average packs/day: 0.5 packs/day for 25.1 years (12.6 ttl pk-yrs)     Types: Cigarettes     Start date: 5/1/2000     Passive exposure: Current    Smokeless

## 2025-06-17 NOTE — PROGRESS NOTES
Patient identified with two identification factors, Name and Date of Birth.    Chief Complaint   Patient presents with    Breathing Problem     Acute Bronchitis        /76 (BP Site: Left Upper Arm, Patient Position: Sitting, BP Cuff Size: Medium Adult)   Pulse 67   Resp 18   Ht 1.702 m (5' 7\")   Wt 77.1 kg (170 lb)   SpO2 92%   BMI 26.63 kg/m²       1. \"Have you been to the ER, urgent care clinic since your last visit?  Hospitalized since your last visit?\" Yes Urgent care for acute bronchitis     2. \"Have you seen or consulted any other health care providers outside of the [facility] since your last visit?\" No

## 2025-07-15 ENCOUNTER — OFFICE VISIT (OUTPATIENT)
Facility: CLINIC | Age: 60
End: 2025-07-15
Payer: MEDICAID

## 2025-07-15 VITALS
TEMPERATURE: 97 F | WEIGHT: 170 LBS | SYSTOLIC BLOOD PRESSURE: 143 MMHG | DIASTOLIC BLOOD PRESSURE: 90 MMHG | OXYGEN SATURATION: 99 % | BODY MASS INDEX: 26.68 KG/M2 | RESPIRATION RATE: 15 BRPM | HEIGHT: 67 IN | HEART RATE: 69 BPM

## 2025-07-15 DIAGNOSIS — M25.461 EFFUSION OF BOTH KNEE JOINTS: Primary | ICD-10-CM

## 2025-07-15 DIAGNOSIS — M25.462 EFFUSION OF BOTH KNEE JOINTS: Primary | ICD-10-CM

## 2025-07-15 PROCEDURE — 99214 OFFICE O/P EST MOD 30 MIN: CPT | Performed by: INTERNAL MEDICINE

## 2025-07-15 RX ORDER — ROSUVASTATIN CALCIUM 5 MG/1
5 TABLET, COATED ORAL NIGHTLY
Qty: 30 TABLET | Refills: 3 | Status: SHIPPED | OUTPATIENT
Start: 2025-07-15

## 2025-07-15 ASSESSMENT — PATIENT HEALTH QUESTIONNAIRE - PHQ9
SUM OF ALL RESPONSES TO PHQ QUESTIONS 1-9: 0
SUM OF ALL RESPONSES TO PHQ QUESTIONS 1-9: 0
2. FEELING DOWN, DEPRESSED OR HOPELESS: NOT AT ALL
SUM OF ALL RESPONSES TO PHQ QUESTIONS 1-9: 0
1. LITTLE INTEREST OR PLEASURE IN DOING THINGS: NOT AT ALL
SUM OF ALL RESPONSES TO PHQ QUESTIONS 1-9: 0

## 2025-07-15 NOTE — PROGRESS NOTES
Grace Taylor is a 60 y.o. female and presents with Asthma and Knee Pain  .  Subjective:  Knee pain Review:  Patient complains of left knee pain. Onset of the symptoms was months  ago. Inciting event: longstanding problem which has been getting worse. Current symptoms include pain location: both: medial and swelling. none,  Aggravating symptoms: going up and down stairs, walking, lateral movements, any weight bearing. Patient's overall course: gradually worsening Patient has had prior knee problems. Evaluation to date: none.  Treatment to date:NSAIDS          COPD REVIEW:  The patient is being seen for follow up of COPD,the patient has been stable  Oxygen: She currently is not on home oxygen therapy.  Symptoms: chronic dyspnea: severity = not present: course of sx: stable.   Patient uses 2 pillows at night.   Patient does continue to smoke cigarettes.       Dyslipidemia Review:  Patient presents for evaluation of lipids.  Compliance with treatment thus far has been excellent.  A repeat fasting lipid profile was not done.  The patient does not use medications that may worsen dyslipidemias (corticosteroids, progestins, anabolic steroids, amiodarone, cyclosporine, olanzapine). The patient exercises some  Her last ldl was 116           Lab Results   Component Value Date    WBC 8.5 04/02/2025    HGB 14.2 04/02/2025    HCT 42.8 04/02/2025     04/02/2025    CHOL 207 (H) 03/29/2024    TRIG 127 03/29/2024    HDL 66 03/29/2024    ALT 11 04/02/2025    AST 25 04/02/2025     04/02/2025    K 6.1 (H) 04/02/2025    CL 99 04/02/2025    CREATININE 0.94 04/02/2025    BUN 12 04/02/2025    CO2 21 04/02/2025         Review of Systems  Constitutional: negative for fevers, chills, anorexia and weight loss  Eyes:   negative for visual disturbance and irritation  ENT:   negative for tinnitus,sore throat,nasal congestion,ear pains.hoarseness  Respiratory:  negative for cough, hemoptysis, dyspnea,wheezing  CV:   negative for

## 2025-07-15 NOTE — PROGRESS NOTES
Chief Complaint   Patient presents with    Asthma    Knee Pain       1. Have you been to the ER, urgent care clinic since your last visit?  Hospitalized since your last visit?No    2. Have you seen or consulted any other health care providers outside of the [facility] since your last visit?  Include any pap smears or colon screening. No

## 2025-08-12 ENCOUNTER — OFFICE VISIT (OUTPATIENT)
Facility: CLINIC | Age: 60
End: 2025-08-12
Payer: MEDICAID

## 2025-08-12 VITALS
DIASTOLIC BLOOD PRESSURE: 70 MMHG | OXYGEN SATURATION: 95 % | RESPIRATION RATE: 18 BRPM | HEIGHT: 67 IN | WEIGHT: 170 LBS | TEMPERATURE: 97.3 F | BODY MASS INDEX: 26.68 KG/M2 | SYSTOLIC BLOOD PRESSURE: 110 MMHG | HEART RATE: 81 BPM

## 2025-08-12 DIAGNOSIS — M25.462 EFFUSION OF BOTH KNEE JOINTS: Primary | ICD-10-CM

## 2025-08-12 DIAGNOSIS — J41.0 SIMPLE CHRONIC BRONCHITIS (HCC): ICD-10-CM

## 2025-08-12 DIAGNOSIS — E78.00 HYPERCHOLESTEREMIA: ICD-10-CM

## 2025-08-12 DIAGNOSIS — M25.461 EFFUSION OF BOTH KNEE JOINTS: Primary | ICD-10-CM

## 2025-08-12 DIAGNOSIS — E87.5 HYPERKALEMIA: ICD-10-CM

## 2025-08-12 PROCEDURE — 99214 OFFICE O/P EST MOD 30 MIN: CPT | Performed by: INTERNAL MEDICINE

## 2025-08-12 SDOH — ECONOMIC STABILITY: FOOD INSECURITY: WITHIN THE PAST 12 MONTHS, THE FOOD YOU BOUGHT JUST DIDN'T LAST AND YOU DIDN'T HAVE MONEY TO GET MORE.: NEVER TRUE

## 2025-08-12 SDOH — ECONOMIC STABILITY: FOOD INSECURITY: WITHIN THE PAST 12 MONTHS, YOU WORRIED THAT YOUR FOOD WOULD RUN OUT BEFORE YOU GOT MONEY TO BUY MORE.: NEVER TRUE

## 2025-08-12 ASSESSMENT — PATIENT HEALTH QUESTIONNAIRE - PHQ9
1. LITTLE INTEREST OR PLEASURE IN DOING THINGS: NOT AT ALL
SUM OF ALL RESPONSES TO PHQ QUESTIONS 1-9: 0
SUM OF ALL RESPONSES TO PHQ QUESTIONS 1-9: 0
2. FEELING DOWN, DEPRESSED OR HOPELESS: NOT AT ALL
SUM OF ALL RESPONSES TO PHQ QUESTIONS 1-9: 0
SUM OF ALL RESPONSES TO PHQ QUESTIONS 1-9: 0